# Patient Record
Sex: MALE | Race: WHITE | Employment: FULL TIME
[De-identification: names, ages, dates, MRNs, and addresses within clinical notes are randomized per-mention and may not be internally consistent; named-entity substitution may affect disease eponyms.]

---

## 2017-01-30 ENCOUNTER — OFFICE VISIT (OUTPATIENT)
Dept: FAMILY MEDICINE CLINIC | Facility: CLINIC | Age: 52
End: 2017-01-30

## 2017-01-30 VITALS
HEART RATE: 57 BPM | WEIGHT: 170 LBS | BODY MASS INDEX: 26.63 KG/M2 | SYSTOLIC BLOOD PRESSURE: 133 MMHG | DIASTOLIC BLOOD PRESSURE: 78 MMHG

## 2017-01-30 DIAGNOSIS — I10 BENIGN ESSENTIAL HTN: Primary | ICD-10-CM

## 2017-01-30 DIAGNOSIS — E78.00 HYPERCHOLESTEREMIA: ICD-10-CM

## 2017-01-30 PROCEDURE — 99213 OFFICE O/P EST LOW 20 MIN: CPT | Performed by: FAMILY MEDICINE

## 2017-01-30 ASSESSMENT — ENCOUNTER SYMPTOMS
WHEEZING: 0
DIARRHEA: 0
EYE PAIN: 0
SORE THROAT: 0
ABDOMINAL PAIN: 0
TROUBLE SWALLOWING: 0
SHORTNESS OF BREATH: 0
VOMITING: 0

## 2017-02-20 RX ORDER — CARVEDILOL 12.5 MG/1
TABLET ORAL
Qty: 60 TABLET | Refills: 5 | Status: SHIPPED | OUTPATIENT
Start: 2017-02-20 | End: 2017-06-02

## 2017-06-02 ENCOUNTER — OFFICE VISIT (OUTPATIENT)
Dept: FAMILY MEDICINE CLINIC | Age: 52
End: 2017-06-02
Payer: COMMERCIAL

## 2017-06-02 VITALS
BODY MASS INDEX: 27 KG/M2 | DIASTOLIC BLOOD PRESSURE: 80 MMHG | TEMPERATURE: 97.4 F | HEIGHT: 67 IN | OXYGEN SATURATION: 98 % | SYSTOLIC BLOOD PRESSURE: 125 MMHG | WEIGHT: 172 LBS | HEART RATE: 61 BPM

## 2017-06-02 DIAGNOSIS — N52.9 ERECTILE DYSFUNCTION, UNSPECIFIED ERECTILE DYSFUNCTION TYPE: ICD-10-CM

## 2017-06-02 DIAGNOSIS — I10 ESSENTIAL HYPERTENSION: Primary | ICD-10-CM

## 2017-06-02 DIAGNOSIS — Z12.5 PROSTATE CANCER SCREENING: ICD-10-CM

## 2017-06-02 DIAGNOSIS — Z12.11 COLON CANCER SCREENING: ICD-10-CM

## 2017-06-02 DIAGNOSIS — Z87.891 FORMER CIGARETTE SMOKER: ICD-10-CM

## 2017-06-02 DIAGNOSIS — E78.5 DYSLIPIDEMIA (HIGH LDL; LOW HDL): ICD-10-CM

## 2017-06-02 PROCEDURE — 99214 OFFICE O/P EST MOD 30 MIN: CPT | Performed by: INTERNAL MEDICINE

## 2017-06-02 RX ORDER — LISINOPRIL 20 MG/1
20 TABLET ORAL DAILY
Qty: 30 TABLET | Refills: 2 | Status: SHIPPED | OUTPATIENT
Start: 2017-06-02 | End: 2017-08-22 | Stop reason: SDUPTHER

## 2017-06-02 RX ORDER — SILDENAFIL 50 MG/1
50 TABLET, FILM COATED ORAL PRN
Qty: 6 TABLET | Refills: 3 | Status: SHIPPED | OUTPATIENT
Start: 2017-06-02 | End: 2018-06-21 | Stop reason: DRUGHIGH

## 2017-06-02 ASSESSMENT — PATIENT HEALTH QUESTIONNAIRE - PHQ9
2. FEELING DOWN, DEPRESSED OR HOPELESS: 0
1. LITTLE INTEREST OR PLEASURE IN DOING THINGS: 0
SUM OF ALL RESPONSES TO PHQ QUESTIONS 1-9: 0
SUM OF ALL RESPONSES TO PHQ9 QUESTIONS 1 & 2: 0

## 2017-06-17 LAB
ALBUMIN SERPL-MCNC: 4.7 G/DL (ref 3.2–5.3)
ALK PHOSPHATASE: 80 IU/L (ref 35–121)
ALT SERPL-CCNC: 24 IU/L (ref 5–59)
ANION GAP SERPL CALCULATED.3IONS-SCNC: 12 MMOL/L
AST SERPL-CCNC: 19 IU/L (ref 10–42)
BILIRUB SERPL-MCNC: 1.2 MG/DL (ref 0.2–1.3)
BUN BLDV-MCNC: 25 MG/DL (ref 10–20)
CALCIUM SERPL-MCNC: 9.8 MG/DL (ref 8.7–10.8)
CHLORIDE BLD-SCNC: 98 MMOL/L (ref 95–111)
CHOLESTEROL/HDL RATIO: 4.1
CHOLESTEROL: 183 MG/DL
CO2: 26 MMOL/L (ref 21–32)
CREAT SERPL-MCNC: 1.3 MG/DL (ref 0.5–1.3)
EGFR AFRICAN AMERICAN: 71
EGFR IF NONAFRICAN AMERICAN: 58
GLUCOSE: 89 MG/DL (ref 70–100)
HDLC SERPL-MCNC: 45 MG/DL (ref 40–60)
LDL CHOLESTEROL CALCULATED: 124 MG/DL
LDL/HDL RATIO: 2.8
POTASSIUM SERPL-SCNC: 4.4 MMOL/L (ref 3.5–5.4)
SODIUM BLD-SCNC: 132 MMOL/L (ref 134–147)
TOTAL PROTEIN: 7.3 G/DL (ref 5.8–8)
TRIGL SERPL-MCNC: 70 MG/DL
VLDLC SERPL CALC-MCNC: 14 MG/DL

## 2017-06-19 LAB — PSA, ULTRASENSITIVE: 0.49 NG/ML

## 2017-06-20 ENCOUNTER — TELEPHONE (OUTPATIENT)
Dept: FAMILY MEDICINE CLINIC | Age: 52
End: 2017-06-20

## 2017-06-20 ENCOUNTER — NURSE ONLY (OUTPATIENT)
Dept: FAMILY MEDICINE CLINIC | Age: 52
End: 2017-06-20

## 2017-06-20 VITALS — SYSTOLIC BLOOD PRESSURE: 114 MMHG | DIASTOLIC BLOOD PRESSURE: 66 MMHG | HEART RATE: 78 BPM

## 2017-06-20 DIAGNOSIS — I10 ESSENTIAL HYPERTENSION: Primary | ICD-10-CM

## 2017-06-20 DIAGNOSIS — I10 ESSENTIAL HYPERTENSION: ICD-10-CM

## 2017-06-20 DIAGNOSIS — R19.5 POSITIVE FIT (FECAL IMMUNOCHEMICAL TEST): Primary | ICD-10-CM

## 2017-06-20 DIAGNOSIS — Z12.5 PROSTATE CANCER SCREENING: ICD-10-CM

## 2017-06-20 DIAGNOSIS — Z12.11 COLON CANCER SCREENING: ICD-10-CM

## 2017-06-20 DIAGNOSIS — E78.5 DYSLIPIDEMIA (HIGH LDL; LOW HDL): ICD-10-CM

## 2017-06-20 LAB
CONTROL: PRESENT
HEMOCCULT STL QL: POSITIVE

## 2017-06-20 PROCEDURE — 82274 ASSAY TEST FOR BLOOD FECAL: CPT | Performed by: INTERNAL MEDICINE

## 2017-07-07 ENCOUNTER — OFFICE VISIT (OUTPATIENT)
Dept: GASTROENTEROLOGY | Age: 52
End: 2017-07-07
Payer: COMMERCIAL

## 2017-07-07 VITALS
DIASTOLIC BLOOD PRESSURE: 82 MMHG | WEIGHT: 168.4 LBS | SYSTOLIC BLOOD PRESSURE: 120 MMHG | BODY MASS INDEX: 26.43 KG/M2 | HEIGHT: 67 IN | OXYGEN SATURATION: 98 % | RESPIRATION RATE: 14 BRPM | TEMPERATURE: 98.1 F | HEART RATE: 65 BPM

## 2017-07-07 DIAGNOSIS — Z12.11 SCREENING FOR COLORECTAL CANCER: Primary | ICD-10-CM

## 2017-07-07 DIAGNOSIS — R19.5 POSITIVE FIT (FECAL IMMUNOCHEMICAL TEST): ICD-10-CM

## 2017-07-07 DIAGNOSIS — Z12.12 SCREENING FOR COLORECTAL CANCER: Primary | ICD-10-CM

## 2017-07-07 PROCEDURE — 99244 OFF/OP CNSLTJ NEW/EST MOD 40: CPT | Performed by: INTERNAL MEDICINE

## 2017-07-07 RX ORDER — SODIUM, POTASSIUM,MAG SULFATES 17.5-3.13G
SOLUTION, RECONSTITUTED, ORAL ORAL
Qty: 1 BOTTLE | Refills: 0 | Status: SHIPPED | OUTPATIENT
Start: 2017-07-07 | End: 2017-12-21 | Stop reason: ALTCHOICE

## 2017-07-07 ASSESSMENT — ENCOUNTER SYMPTOMS
DIARRHEA: 0
ABDOMINAL DISTENTION: 0
TROUBLE SWALLOWING: 0
CONSTIPATION: 0
VOMITING: 0
EYES NEGATIVE: 1
ABDOMINAL PAIN: 0
GASTROINTESTINAL NEGATIVE: 1
RESPIRATORY NEGATIVE: 1
BLOOD IN STOOL: 0
ANAL BLEEDING: 0
NAUSEA: 0
BACK PAIN: 1
ALLERGIC/IMMUNOLOGIC NEGATIVE: 1
RECTAL PAIN: 0

## 2017-08-22 DIAGNOSIS — I10 ESSENTIAL HYPERTENSION: ICD-10-CM

## 2017-08-22 RX ORDER — LISINOPRIL 20 MG/1
TABLET ORAL
Qty: 30 TABLET | Refills: 5 | Status: SHIPPED | OUTPATIENT
Start: 2017-08-22 | End: 2018-02-19 | Stop reason: SDUPTHER

## 2017-09-21 ENCOUNTER — OFFICE VISIT (OUTPATIENT)
Dept: FAMILY MEDICINE CLINIC | Age: 52
End: 2017-09-21
Payer: COMMERCIAL

## 2017-09-21 VITALS
WEIGHT: 169 LBS | HEART RATE: 93 BPM | DIASTOLIC BLOOD PRESSURE: 77 MMHG | OXYGEN SATURATION: 98 % | TEMPERATURE: 98.4 F | SYSTOLIC BLOOD PRESSURE: 116 MMHG | BODY MASS INDEX: 26.47 KG/M2

## 2017-09-21 DIAGNOSIS — E78.5 DYSLIPIDEMIA (HIGH LDL; LOW HDL): ICD-10-CM

## 2017-09-21 DIAGNOSIS — R19.5 POSITIVE FIT (FECAL IMMUNOCHEMICAL TEST): ICD-10-CM

## 2017-09-21 DIAGNOSIS — I10 ESSENTIAL HYPERTENSION: Primary | ICD-10-CM

## 2017-09-21 DIAGNOSIS — H00.011 HORDEOLUM EXTERNUM OF RIGHT UPPER EYELID: ICD-10-CM

## 2017-09-21 PROCEDURE — 99214 OFFICE O/P EST MOD 30 MIN: CPT | Performed by: INTERNAL MEDICINE

## 2017-09-25 ENCOUNTER — HOSPITAL ENCOUNTER (OUTPATIENT)
Age: 52
Setting detail: OUTPATIENT SURGERY
Discharge: HOME | End: 2017-09-25
Attending: INTERNAL MEDICINE | Admitting: INTERNAL MEDICINE
Payer: COMMERCIAL

## 2017-09-25 VITALS
SYSTOLIC BLOOD PRESSURE: 123 MMHG | TEMPERATURE: 97.2 F | WEIGHT: 165 LBS | RESPIRATION RATE: 14 BRPM | HEART RATE: 73 BPM | BODY MASS INDEX: 25.9 KG/M2 | OXYGEN SATURATION: 95 % | HEIGHT: 67 IN | DIASTOLIC BLOOD PRESSURE: 77 MMHG

## 2017-09-25 PROCEDURE — 2580000003 HC RX 258: Performed by: INTERNAL MEDICINE

## 2017-09-25 PROCEDURE — 6360000002 HC RX W HCPCS: Performed by: INTERNAL MEDICINE

## 2017-09-25 PROCEDURE — 99153 MOD SED SAME PHYS/QHP EA: CPT | Performed by: INTERNAL MEDICINE

## 2017-09-25 PROCEDURE — 7100000011 HC PHASE II RECOVERY - ADDTL 15 MIN: Performed by: INTERNAL MEDICINE

## 2017-09-25 PROCEDURE — 88305 TISSUE EXAM BY PATHOLOGIST: CPT

## 2017-09-25 PROCEDURE — 99152 MOD SED SAME PHYS/QHP 5/>YRS: CPT | Performed by: INTERNAL MEDICINE

## 2017-09-25 PROCEDURE — 3609010300 HC COLONOSCOPY W/BIOPSY SINGLE/MULTIPLE: Performed by: INTERNAL MEDICINE

## 2017-09-25 PROCEDURE — 7100000010 HC PHASE II RECOVERY - FIRST 15 MIN: Performed by: INTERNAL MEDICINE

## 2017-09-25 RX ORDER — MEPERIDINE HYDROCHLORIDE 50 MG/ML
INJECTION INTRAMUSCULAR; INTRAVENOUS; SUBCUTANEOUS PRN
Status: DISCONTINUED | OUTPATIENT
Start: 2017-09-25 | End: 2017-09-25 | Stop reason: HOSPADM

## 2017-09-25 RX ORDER — SODIUM CHLORIDE, SODIUM LACTATE, POTASSIUM CHLORIDE, CALCIUM CHLORIDE 600; 310; 30; 20 MG/100ML; MG/100ML; MG/100ML; MG/100ML
INJECTION, SOLUTION INTRAVENOUS CONTINUOUS
Status: DISCONTINUED | OUTPATIENT
Start: 2017-09-25 | End: 2017-09-25 | Stop reason: HOSPADM

## 2017-09-25 RX ORDER — MIDAZOLAM HYDROCHLORIDE 1 MG/ML
INJECTION INTRAMUSCULAR; INTRAVENOUS PRN
Status: DISCONTINUED | OUTPATIENT
Start: 2017-09-25 | End: 2017-09-25 | Stop reason: HOSPADM

## 2017-09-25 RX ADMIN — SODIUM CHLORIDE, POTASSIUM CHLORIDE, SODIUM LACTATE AND CALCIUM CHLORIDE: 600; 310; 30; 20 INJECTION, SOLUTION INTRAVENOUS at 08:10

## 2017-09-25 ASSESSMENT — PAIN SCALES - GENERAL
PAINLEVEL_OUTOF10: 0

## 2017-09-25 ASSESSMENT — PAIN - FUNCTIONAL ASSESSMENT: PAIN_FUNCTIONAL_ASSESSMENT: 0-10

## 2017-09-26 LAB — SURGICAL PATHOLOGY REPORT: NORMAL

## 2017-10-09 ENCOUNTER — OFFICE VISIT (OUTPATIENT)
Dept: GASTROENTEROLOGY | Age: 52
End: 2017-10-09
Payer: COMMERCIAL

## 2017-10-09 VITALS
WEIGHT: 169.1 LBS | TEMPERATURE: 98.1 F | SYSTOLIC BLOOD PRESSURE: 134 MMHG | RESPIRATION RATE: 14 BRPM | BODY MASS INDEX: 26.54 KG/M2 | HEART RATE: 78 BPM | DIASTOLIC BLOOD PRESSURE: 86 MMHG | HEIGHT: 67 IN

## 2017-10-09 DIAGNOSIS — T39.395D: ICD-10-CM

## 2017-10-09 DIAGNOSIS — K52.9 ILEITIS: Primary | ICD-10-CM

## 2017-10-09 DIAGNOSIS — K63.89: ICD-10-CM

## 2017-10-09 PROCEDURE — 99214 OFFICE O/P EST MOD 30 MIN: CPT | Performed by: INTERNAL MEDICINE

## 2017-10-09 ASSESSMENT — ENCOUNTER SYMPTOMS
BLOOD IN STOOL: 0
RESPIRATORY NEGATIVE: 1
ABDOMINAL DISTENTION: 0
VOMITING: 0
EYES NEGATIVE: 1
ALLERGIC/IMMUNOLOGIC NEGATIVE: 1
TROUBLE SWALLOWING: 0
NAUSEA: 0
DIARRHEA: 0
BACK PAIN: 1
ABDOMINAL PAIN: 0
RECTAL PAIN: 0
ANAL BLEEDING: 0
GASTROINTESTINAL NEGATIVE: 1
CONSTIPATION: 0

## 2017-10-09 NOTE — PROGRESS NOTES
Constitutional: He is oriented to person, place, and time. He appears well-developed and well-nourished. HENT:   Head: Normocephalic and atraumatic. Eyes: Conjunctivae and EOM are normal. Pupils are equal, round, and reactive to light. Neck: Normal range of motion. Neck supple. Cardiovascular: Normal rate and regular rhythm. Pulmonary/Chest: Effort normal and breath sounds normal.   Abdominal: Soft. Bowel sounds are normal.   NON TENDER, NON DISTENTED  LIVER SPLEEN AND HERNIAS ARE NOT  PALPABLE  BOWEL SOUNDS ARE POSITIVE      Genitourinary: Rectum normal.   Musculoskeletal: Normal range of motion. Neurological: He is alert and oriented to person, place, and time. He has normal reflexes. Skin: Skin is warm. Assessment:      As above      Plan:      Avoid NSAIDs    Pt was advised in detail about some life style and dietary modifications. He was advised about avoidance of caffeine, nicotine and chocolate. Pt was also told to stay away from any kind of fast foods, soda pops. He was also advised to avoid lots of spices, grease and fried food etc.     Instructions were also given about trying to arrange the timing, quality and quantity of food. Instructions were given about using ample amount of fiber including dietary and supplemental fiber either metamucil, bennafiber or citrucell etc.  Pt was advised about drinking ample amount of water without any colors or chemicals. Stress was given about regular exercise. Pt has verbalized understanding and agreement to these modifications.       The patient was instructed to start taking some OTC Probiotics products   These are available over the counter at the Pharmacy stores and Grocery stores  He was explained about the beneficial effects they have in the GI track  They will help to establish the good bacterial kory and will help with the digestion and bowel movements  The patient has verbalized understanding and agreement to this plan    Repeat Colon in 5 years if stable

## 2017-11-20 NOTE — TELEPHONE ENCOUNTER
Last filled 11/21/16 #180 with 2 RF  Last seen 9/21/17    Next Visit Date:  Future Appointments  Date Time Provider Guzman Love   12/21/2017 1:00 PM Funmilayo Jackson  [address] [facility]   Topic Date Due    Flu vaccine (1) 09/01/2017    DTaP/Tdap/Td vaccine (2 - Td) 02/14/2022    Lipid screen  06/17/2022    Colon cancer screen colonoscopy  09/25/2022    HIV screen  Addressed       Hemoglobin A1C (%)   Date Value   09/27/2014 5.8             ( goal A1C is < 7)   No results found for: LABMICR  LDL Calculated (mg/dL)   Date Value   06/17/2017 124       (goal LDL is <100)   AST (IU/L)   Date Value   06/17/2017 19     ALT (IU/L)   Date Value   06/17/2017 24     BUN (mg/dl)   Date Value   06/17/2017 25 (H)     BP Readings from Last 3 Encounters:   10/09/17 134/86   09/25/17 123/77   09/21/17 116/77          (goal 120/80)    All Future Testing planned in [facility]  Lab Frequency Next Occurrence               Patient Active Problem List:     Dyslipidemia (high LDL; low HDL)     Former cigarette smoker     Essential hypertension     Positive FIT (fecal immunochemical test)     Edema of colon

## 2017-11-21 RX ORDER — NAPROXEN 500 MG/1
TABLET ORAL
Qty: 180 TABLET | Refills: 1 | Status: SHIPPED | OUTPATIENT
Start: 2017-11-21 | End: 2018-07-16 | Stop reason: SDUPTHER

## 2017-12-21 ENCOUNTER — OFFICE VISIT (OUTPATIENT)
Dept: FAMILY MEDICINE CLINIC | Age: 52
End: 2017-12-21
Payer: COMMERCIAL

## 2017-12-21 VITALS
BODY MASS INDEX: 26.28 KG/M2 | HEART RATE: 77 BPM | DIASTOLIC BLOOD PRESSURE: 80 MMHG | SYSTOLIC BLOOD PRESSURE: 134 MMHG | WEIGHT: 167.8 LBS | OXYGEN SATURATION: 97 % | TEMPERATURE: 97.5 F

## 2017-12-21 DIAGNOSIS — K50.00 TERMINAL ILEITIS WITHOUT COMPLICATION (HCC): ICD-10-CM

## 2017-12-21 DIAGNOSIS — E78.5 DYSLIPIDEMIA (HIGH LDL; LOW HDL): ICD-10-CM

## 2017-12-21 DIAGNOSIS — I10 ESSENTIAL HYPERTENSION: Primary | ICD-10-CM

## 2017-12-21 PROCEDURE — 99214 OFFICE O/P EST MOD 30 MIN: CPT | Performed by: INTERNAL MEDICINE

## 2017-12-21 NOTE — PROGRESS NOTES
Component Value Date    GLUCOSE 89 06/17/2017    CO2 26 06/17/2017    BUN 25 06/17/2017    CREATININE 1.3 06/17/2017    CALCIUM 9.8 06/17/2017       Colonoscopy reviewed     Assessment/Plan:           1. Essential hypertension  - continue lisinopril 20mg pO QD     2. Terminal ileitis without complication ([facility] [address]  - asymptomatic   - will f/u GI  - try to cut back on NSAIDs    3.  Dyslipidemia (high LDL; low HDL)  - lifestyle management, labs due 6/2017      Electronically signed by Macrina Carrera MD on 12/21/2017 at 1:27 PM

## 2018-02-19 DIAGNOSIS — I10 ESSENTIAL HYPERTENSION: ICD-10-CM

## 2018-02-19 RX ORDER — LISINOPRIL 20 MG/1
TABLET ORAL
Qty: 30 TABLET | Refills: 5 | Status: SHIPPED | OUTPATIENT
Start: 2018-02-19 | End: 2018-08-20 | Stop reason: SDUPTHER

## 2018-02-19 NOTE — TELEPHONE ENCOUNTER
Last refill 01/18/2018  Last visit 12/21/2017    Next Visit Date:  Future Appointments  Date Time Provider Guzman De Oliveirai   6/21/2018 1:00 PM Funmilayo Brock  [address] [facility]   Topic Date Due    A1C test (Diabetic or Prediabetic)  09/27/2015    Flu vaccine (1) 12/21/2019 (Originally 9/1/2017)    Potassium monitoring  06/17/2018    Creatinine monitoring  06/17/2018    DTaP/Tdap/Td vaccine (2 - Td) 02/14/2022    Lipid screen  06/17/2022    Colon cancer screen colonoscopy  09/25/2022    HIV screen  Addressed       Hemoglobin A1C (%)   Date Value   09/27/2014 5.8             ( goal A1C is < 7)   No results found for: LABMICR  LDL Calculated (mg/dL)   Date Value   06/17/2017 124       (goal LDL is <100)   AST (IU/L)   Date Value   06/17/2017 19     ALT (IU/L)   Date Value   06/17/2017 24     BUN (mg/dl)   Date Value   06/17/2017 25 (H)     BP Readings from Last 3 Encounters:   12/21/17 134/80   10/09/17 134/86   09/25/17 123/77          (goal 120/80)    All Future Testing planned in [facility]  Lab Frequency Next Occurrence               Patient Active Problem List:     Dyslipidemia (high LDL; low HDL)     Former cigarette smoker     Essential hypertension     Positive FIT (fecal immunochemical test)     Edema of colon     Terminal ileitis without complication ([facility] [address]

## 2018-06-21 ENCOUNTER — OFFICE VISIT (OUTPATIENT)
Dept: FAMILY MEDICINE CLINIC | Age: 53
End: 2018-06-21
Payer: COMMERCIAL

## 2018-06-21 VITALS
HEART RATE: 80 BPM | OXYGEN SATURATION: 95 % | WEIGHT: 158.2 LBS | BODY MASS INDEX: 24.78 KG/M2 | RESPIRATION RATE: 16 BRPM | SYSTOLIC BLOOD PRESSURE: 104 MMHG | TEMPERATURE: 98.1 F | DIASTOLIC BLOOD PRESSURE: 70 MMHG

## 2018-06-21 DIAGNOSIS — E78.5 DYSLIPIDEMIA (HIGH LDL; LOW HDL): ICD-10-CM

## 2018-06-21 DIAGNOSIS — Z13.1 SCREENING FOR DIABETES MELLITUS (DM): ICD-10-CM

## 2018-06-21 DIAGNOSIS — K50.00 TERMINAL ILEITIS WITHOUT COMPLICATION (HCC): ICD-10-CM

## 2018-06-21 DIAGNOSIS — N52.9 ERECTILE DYSFUNCTION, UNSPECIFIED ERECTILE DYSFUNCTION TYPE: ICD-10-CM

## 2018-06-21 DIAGNOSIS — I10 ESSENTIAL HYPERTENSION: Primary | ICD-10-CM

## 2018-06-21 DIAGNOSIS — Z13.29 SCREENING FOR THYROID DISORDER: ICD-10-CM

## 2018-06-21 DIAGNOSIS — M62.838 MUSCLE SPASM: ICD-10-CM

## 2018-06-21 PROCEDURE — 99214 OFFICE O/P EST MOD 30 MIN: CPT | Performed by: INTERNAL MEDICINE

## 2018-06-21 RX ORDER — SILDENAFIL 100 MG/1
100 TABLET, FILM COATED ORAL PRN
Qty: 6 TABLET | Refills: 0 | Status: SHIPPED | OUTPATIENT
Start: 2018-06-21 | End: 2018-07-16 | Stop reason: SDUPTHER

## 2018-06-21 RX ORDER — CYCLOBENZAPRINE HCL 10 MG
10 TABLET ORAL NIGHTLY PRN
Qty: 30 TABLET | Refills: 0 | Status: SHIPPED | OUTPATIENT
Start: 2018-06-21 | End: 2019-06-14 | Stop reason: SDUPTHER

## 2018-06-21 ASSESSMENT — PATIENT HEALTH QUESTIONNAIRE - PHQ9
2. FEELING DOWN, DEPRESSED OR HOPELESS: 0
SUM OF ALL RESPONSES TO PHQ9 QUESTIONS 1 & 2: 0
SUM OF ALL RESPONSES TO PHQ QUESTIONS 1-9: 0
1. LITTLE INTEREST OR PLEASURE IN DOING THINGS: 0

## 2018-07-16 DIAGNOSIS — N52.9 ERECTILE DYSFUNCTION, UNSPECIFIED ERECTILE DYSFUNCTION TYPE: ICD-10-CM

## 2018-07-16 RX ORDER — NAPROXEN 500 MG/1
TABLET ORAL
Qty: 180 TABLET | Refills: 1 | Status: SHIPPED | OUTPATIENT
Start: 2018-07-16 | End: 2019-04-17 | Stop reason: SDUPTHER

## 2018-07-16 RX ORDER — SILDENAFIL 100 MG/1
TABLET, FILM COATED ORAL
Qty: 6 TABLET | Refills: 0 | Status: SHIPPED | OUTPATIENT
Start: 2018-07-16 | End: 2018-08-19 | Stop reason: SDUPTHER

## 2018-07-16 NOTE — TELEPHONE ENCOUNTER
Last seen 6/21/18  Next appt 12/21/18    Next Visit Date:  Future Appointments  Date Time Provider Guzman Love   12/21/2018 1:00 PM Funmilayo Lucero  [address] [facility]   Topic Date Due    A1C test (Diabetic or Prediabetic)  09/27/2015    Potassium monitoring  06/17/2018    Creatinine monitoring  06/17/2018    Shingles Vaccine (1 of 2 - 2 Dose Series) 06/21/2019 (Originally 5/29/2017)    Flu vaccine (1) 12/21/2019 (Originally 9/1/2018)    DTaP/Tdap/Td vaccine (2 - Td) 02/14/2022    Lipid screen  06/17/2022    Colon cancer screen colonoscopy  09/25/2022    HIV screen  Addressed       Hemoglobin A1C (%)   Date Value   09/27/2014 5.8             ( goal A1C is < 7)   No results found for: LABMICR  LDL Calculated (mg/dL)   Date Value   06/17/2017 124   11/12/2016 131 (H)       (goal LDL is <100)   AST (IU/L)   Date Value   06/17/2017 19     ALT (IU/L)   Date Value   06/17/2017 24     BUN (mg/dl)   Date Value   06/17/2017 25 (H)     BP Readings from Last 3 Encounters:   06/21/18 104/70   12/21/17 134/80   10/09/17 134/86          (goal 120/80)    All Future Testing planned in [facility]  Lab Frequency Next Occurrence   Comprehensive Metabolic Panel Once 86/57/6560   Lipid, Fasting Once 08/06/2018   TSH With Reflex Ft4 Once 08/06/2018   Hemoglobin A1C Once 08/06/2018               Patient Active Problem List:     Dyslipidemia (high LDL; low HDL)     Former cigarette smoker     Essential hypertension     Positive FIT (fecal immunochemical test)     Edema of colon     Terminal ileitis without complication ([address]

## 2018-07-17 LAB
ALBUMIN SERPL-MCNC: 4.8 G/DL
ALP BLD-CCNC: 86 U/L
ALT SERPL-CCNC: 22 U/L
ANION GAP SERPL CALCULATED.3IONS-SCNC: 14 MMOL/L
AST SERPL-CCNC: 20 U/L
AVERAGE GLUCOSE: 108
BILIRUB SERPL-MCNC: 1 MG/DL (ref 0.1–1.4)
BUN BLDV-MCNC: 16 MG/DL
CALCIUM SERPL-MCNC: 9.3 MG/DL
CHLORIDE BLD-SCNC: 98 MMOL/L
CHOLESTEROL, FASTING: 160
CO2: 24 MMOL/L
CREAT SERPL-MCNC: 1 MG/DL
GFR CALCULATED: >59
GLUCOSE BLD-MCNC: 89 MG/DL
HBA1C MFR BLD: 5.4 %
HDLC SERPL-MCNC: 41 MG/DL (ref 35–70)
LDL CHOLESTEROL CALCULATED: 105 MG/DL (ref 0–160)
POTASSIUM SERPL-SCNC: 4.5 MMOL/L
SODIUM BLD-SCNC: 136 MMOL/L
TOTAL PROTEIN: 7.1
TRIGLYCERIDE, FASTING: 68
TSH SERPL DL<=0.05 MIU/L-ACNC: 1.37 UIU/ML

## 2018-07-24 DIAGNOSIS — I10 ESSENTIAL HYPERTENSION: ICD-10-CM

## 2018-07-24 DIAGNOSIS — Z13.29 SCREENING FOR THYROID DISORDER: ICD-10-CM

## 2018-07-24 DIAGNOSIS — Z13.1 SCREENING FOR DIABETES MELLITUS (DM): ICD-10-CM

## 2018-07-24 DIAGNOSIS — E78.5 DYSLIPIDEMIA (HIGH LDL; LOW HDL): ICD-10-CM

## 2018-08-19 DIAGNOSIS — N52.9 ERECTILE DYSFUNCTION, UNSPECIFIED ERECTILE DYSFUNCTION TYPE: ICD-10-CM

## 2018-08-19 DIAGNOSIS — I10 ESSENTIAL HYPERTENSION: ICD-10-CM

## 2018-08-20 RX ORDER — SILDENAFIL 100 MG/1
TABLET, FILM COATED ORAL
Qty: 6 TABLET | Refills: 5 | Status: SHIPPED | OUTPATIENT
Start: 2018-08-20 | End: 2019-09-11 | Stop reason: SDUPTHER

## 2018-08-20 RX ORDER — LISINOPRIL 20 MG/1
TABLET ORAL
Qty: 30 TABLET | Refills: 5 | Status: SHIPPED | OUTPATIENT
Start: 2018-08-20 | End: 2018-12-14 | Stop reason: SDUPTHER

## 2018-08-20 NOTE — TELEPHONE ENCOUNTER
Lisinopril filled 2/2018 #30 with 5 RF  viagra filled 7/16/18 #6 with 0 RF    Next Visit Date:  Future Appointments  Date Time Provider Guzman Love   12/21/2018 1:00 PM Funmilayo Huynh  [address] [facility]   Topic Date Due    Shingles Vaccine (1 of 2 - 2 Dose Series) 06/21/2019 (Originally 5/29/2017)    Flu vaccine (1) 12/21/2019 (Originally 9/1/2018)    Potassium monitoring  07/17/2019    Creatinine monitoring  07/17/2019    DTaP/Tdap/Td vaccine (2 - Td) 02/14/2022    Colon cancer screen colonoscopy  09/25/2022    Lipid screen  07/17/2023    HIV screen  Addressed       Hemoglobin A1C (%)   Date Value   07/17/2018 5.4   09/27/2014 5.8             ( goal A1C is < 7)   No results found for: LABMICR  LDL Calculated (mg/dL)   Date Value   07/17/2018 105   06/17/2017 124       (goal LDL is <100)   AST (U/L)   Date Value   07/17/2018 20     ALT (U/L)   Date Value   07/17/2018 22     BUN (mg/dL)   Date Value   07/17/2018 16     BP Readings from Last 3 Encounters:   06/21/18 104/70   12/21/17 134/80   10/09/17 134/86          (goal 120/80)    All Future Testing planned in [facility]  Lab Frequency Next Occurrence               Patient Active Problem List:     Dyslipidemia (high LDL; low HDL)     Former cigarette smoker     Essential hypertension     Positive FIT (fecal immunochemical test)     Edema of colon     Terminal ileitis without complication ([facility] [address]

## 2018-12-14 ENCOUNTER — OFFICE VISIT (OUTPATIENT)
Dept: FAMILY MEDICINE CLINIC | Age: 53
End: 2018-12-14
Payer: COMMERCIAL

## 2018-12-14 VITALS
HEART RATE: 85 BPM | BODY MASS INDEX: 25.02 KG/M2 | RESPIRATION RATE: 16 BRPM | SYSTOLIC BLOOD PRESSURE: 118 MMHG | TEMPERATURE: 98 F | WEIGHT: 159.4 LBS | DIASTOLIC BLOOD PRESSURE: 68 MMHG | OXYGEN SATURATION: 97 % | HEIGHT: 67 IN

## 2018-12-14 DIAGNOSIS — I10 ESSENTIAL HYPERTENSION: ICD-10-CM

## 2018-12-14 DIAGNOSIS — F17.200 CURRENT SMOKER: Primary | ICD-10-CM

## 2018-12-14 DIAGNOSIS — N52.9 ERECTILE DYSFUNCTION, UNSPECIFIED ERECTILE DYSFUNCTION TYPE: ICD-10-CM

## 2018-12-14 DIAGNOSIS — J20.9 ACUTE BRONCHITIS, UNSPECIFIED ORGANISM: ICD-10-CM

## 2018-12-14 DIAGNOSIS — E78.5 DYSLIPIDEMIA (HIGH LDL; LOW HDL): ICD-10-CM

## 2018-12-14 PROCEDURE — 99214 OFFICE O/P EST MOD 30 MIN: CPT | Performed by: INTERNAL MEDICINE

## 2018-12-14 RX ORDER — PREDNISONE 20 MG/1
20 TABLET ORAL DAILY
Qty: 5 TABLET | Refills: 0 | Status: SHIPPED | OUTPATIENT
Start: 2018-12-14 | End: 2018-12-19

## 2018-12-14 RX ORDER — TADALAFIL 5 MG/1
5 TABLET ORAL DAILY
Qty: 30 TABLET | Refills: 0 | Status: SHIPPED | OUTPATIENT
Start: 2018-12-14 | End: 2019-09-11 | Stop reason: SDUPTHER

## 2018-12-14 RX ORDER — AMOXICILLIN AND CLAVULANATE POTASSIUM 875; 125 MG/1; MG/1
1 TABLET, FILM COATED ORAL 2 TIMES DAILY
Qty: 14 TABLET | Refills: 0 | Status: SHIPPED | OUTPATIENT
Start: 2018-12-14 | End: 2018-12-21

## 2018-12-14 RX ORDER — LISINOPRIL 20 MG/1
TABLET ORAL
Qty: 30 TABLET | Refills: 5 | Status: SHIPPED | OUTPATIENT
Start: 2018-12-14 | End: 2019-06-14 | Stop reason: SDUPTHER

## 2018-12-14 RX ORDER — TADALAFIL 5 MG/1
5 TABLET ORAL PRN
Qty: 10 TABLET | Refills: 1 | Status: SHIPPED | OUTPATIENT
Start: 2018-12-14 | End: 2019-06-14 | Stop reason: SDUPTHER

## 2018-12-14 NOTE — PROGRESS NOTES
Years: 28.00     Types: Cigarettes     Last attempt to quit: 11/1/2016    Smokeless tobacco: Never Used    Alcohol use 6.0 oz/week     12 Standard drinks or equivalent per week      Comment: rare        Review of Systems  Energy level good overall, and weight is stable. No chest pain or shortness of breath. Bowels have been normal without constipation or diarrhea       Objective:          /68 (Site: Left Upper Arm, Position: Sitting, Cuff Size: Medium Adult)   Pulse 85   Temp 98 °F (36.7 °C) (Oral)   Resp 16   Ht 5' 7.01\" (1.702 m)   Wt 159 lb 6.4 oz (72.3 kg)   SpO2 97%   BMI 24.96 kg/m²     General: Alert and oriented, in no distress. Patient ambulating with normal gait. Normal body habitus. ENT: clear rhinorrhea, clear post-nasal drip, no oropharyngeal erythema, subcentimeter anterior chain cervical lymphadenopathy noted  Chest: scattered wheezes with globally decreased air entry noted. No retractions, or use of accessory muscles noted. Cardiovascular: PMI is not displaced, and no thrill noted. Regular rate and rhythm with no rub, murmur or gallop. There is no peripheral edema. Pedal pulses are normal.   Abdomen: Abdomen is soft and nontender. The bowel sounds are normal.   Musculoskeletal: There are no deformities of the the extremities. Patient has all ten fingers intact. The patient has full range of motion on all 4 extremities without pain. Skin: The skin is warm and dry. There are no rashes noted. Prior to Visit Medications    Medication Sig Taking?  Authorizing Provider   sildenafil (VIAGRA) 100 MG tablet take 1 tablet by mouth if needed for ERECTILE DYSFUNCTION Yes LUIS E Glover CNP   lisinopril (PRINIVIL;ZESTRIL) 20 MG tablet take 1 tablet by mouth daily Yes LUIS E Glover CNP   naproxen (NAPROSYN) 500 MG tablet take 1 tablet by mouth twice a day with food Yes LUIS E Glover CNP   cyclobenzaprine (FLEXERIL) 10 MG tablet Take 1 tablet by mouth

## 2019-01-22 RX ORDER — TADALAFIL 5 MG/1
TABLET ORAL
Qty: 30 TABLET | Refills: 0 | Status: SHIPPED | OUTPATIENT
Start: 2019-01-22 | End: 2019-06-14 | Stop reason: SDUPTHER

## 2019-04-17 NOTE — TELEPHONE ENCOUNTER
Last visit  12/14/18  Next Visit Date:  Future Appointments   Date Time Provider Guzman De Oliveirai   6/14/2019  9:45 AM Funmilayo Sanders  [address] [facility]   Topic Date Due    Pneumococcal 0-64 years Vaccine (1 of 1 - PPSV23) 05/29/1973    Shingles Vaccine (1 of 2) 06/21/2019 (Originally 5/29/2017)    Flu vaccine (Season Ended) 12/21/2019 (Originally 9/1/2019)    Potassium monitoring  07/17/2019    Creatinine monitoring  07/17/2019    DTaP/Tdap/Td vaccine (2 - Td) 02/14/2022    Colon cancer screen colonoscopy  09/25/2022    Lipid screen  07/17/2023    HIV screen  Addressed       Hemoglobin A1C (%)   Date Value   07/17/2018 5.4   09/27/2014 5.8             ( goal A1C is < 7)   No results found for: LABMICR  LDL Calculated (mg/dL)   Date Value   07/17/2018 105   06/17/2017 124       (goal LDL is <100)   AST (U/L)   Date Value   07/17/2018 20     ALT (U/L)   Date Value   07/17/2018 22     BUN (mg/dL)   Date Value   07/17/2018 16     BP Readings from Last 3 Encounters:   12/14/18 118/68   06/21/18 104/70   12/21/17 134/80          (goal 120/80)    All Future Testing planned in [facility]  Lab Frequency Next Occurrence               Patient Active Problem List:     Dyslipidemia (high LDL; low HDL)     Essential hypertension     Positive FIT (fecal immunochemical test)     Edema of colon     Terminal ileitis without complication (HCC)     Erectile dysfunction

## 2019-04-18 RX ORDER — NAPROXEN 500 MG/1
TABLET ORAL
Qty: 180 TABLET | Refills: 1 | Status: SHIPPED | OUTPATIENT
Start: 2019-04-18 | End: 2019-06-14

## 2019-06-14 ENCOUNTER — OFFICE VISIT (OUTPATIENT)
Dept: FAMILY MEDICINE CLINIC | Age: 54
End: 2019-06-14
Payer: COMMERCIAL

## 2019-06-14 VITALS
WEIGHT: 151.8 LBS | BODY MASS INDEX: 23.77 KG/M2 | RESPIRATION RATE: 16 BRPM | TEMPERATURE: 97.7 F | DIASTOLIC BLOOD PRESSURE: 76 MMHG | OXYGEN SATURATION: 99 % | HEART RATE: 65 BPM | SYSTOLIC BLOOD PRESSURE: 108 MMHG

## 2019-06-14 DIAGNOSIS — Z12.5 PROSTATE CANCER SCREENING: ICD-10-CM

## 2019-06-14 DIAGNOSIS — M62.838 MUSCLE SPASM: ICD-10-CM

## 2019-06-14 DIAGNOSIS — M15.9 PRIMARY OSTEOARTHRITIS INVOLVING MULTIPLE JOINTS: ICD-10-CM

## 2019-06-14 DIAGNOSIS — I10 ESSENTIAL HYPERTENSION: ICD-10-CM

## 2019-06-14 DIAGNOSIS — G47.9 SLEEPING DIFFICULTY: ICD-10-CM

## 2019-06-14 DIAGNOSIS — E78.5 DYSLIPIDEMIA (HIGH LDL; LOW HDL): Primary | ICD-10-CM

## 2019-06-14 DIAGNOSIS — Z72.0 TOBACCO ABUSE: ICD-10-CM

## 2019-06-14 DIAGNOSIS — Z13.0 SCREENING FOR DEFICIENCY ANEMIA: ICD-10-CM

## 2019-06-14 PROCEDURE — 99214 OFFICE O/P EST MOD 30 MIN: CPT | Performed by: INTERNAL MEDICINE

## 2019-06-14 RX ORDER — CYCLOBENZAPRINE HCL 10 MG
10 TABLET ORAL NIGHTLY PRN
Qty: 30 TABLET | Refills: 2 | Status: SHIPPED | OUTPATIENT
Start: 2019-06-14 | End: 2020-09-02 | Stop reason: SDUPTHER

## 2019-06-14 RX ORDER — LISINOPRIL 20 MG/1
TABLET ORAL
Qty: 90 TABLET | Refills: 1 | Status: SHIPPED | OUTPATIENT
Start: 2019-06-14 | End: 2019-12-10 | Stop reason: SDUPTHER

## 2019-06-14 RX ORDER — DICLOFENAC SODIUM 75 MG/1
75 TABLET, DELAYED RELEASE ORAL 2 TIMES DAILY
Qty: 60 TABLET | Refills: 3 | Status: SHIPPED | OUTPATIENT
Start: 2019-06-14 | End: 2019-12-18 | Stop reason: CLARIF

## 2019-06-14 RX ORDER — SAW/PYGEUM/BETA/HERB/D3/B6/ZN 30 MG-25MG
10 CAPSULE ORAL EVERY EVENING
Qty: 30 TABLET | Refills: 3 | Status: SHIPPED | OUTPATIENT
Start: 2019-06-14

## 2019-06-14 ASSESSMENT — PATIENT HEALTH QUESTIONNAIRE - PHQ9
SUM OF ALL RESPONSES TO PHQ QUESTIONS 1-9: 1
SUM OF ALL RESPONSES TO PHQ9 QUESTIONS 1 & 2: 1
1. LITTLE INTEREST OR PLEASURE IN DOING THINGS: 0
2. FEELING DOWN, DEPRESSED OR HOPELESS: 1
SUM OF ALL RESPONSES TO PHQ QUESTIONS 1-9: 1

## 2019-06-14 NOTE — PROGRESS NOTES
Subjective:       Patient ID: Babita Guan is a 48 y.o. male who presents for   Chief Complaint   Patient presents with    Hypertension    Medication Check     Naproxen     Insomnia   , and follow up of chronic medical problems. HPI:  Nursing note reviewed and discussed with patient. Dyslipidemia  Patient presents for evaluation of lipids. Compliance with treatment thus far has been good. A repeat fasting lipid profile was done. The patient does use medications that may worsen dyslipidemias (corticosteroids, progestins, anabolic steroids, diuretics, beta-blockers, amiodarone, cyclosporine, olanzapine). The patient exercises frequently. The patient is not known to have coexisting coronary artery disease. Hypertension  Patient is here for follow-up of elevated blood pressure. He is exercising and is adherent to a low-salt diet. Blood pressure is well controlled at home. Cardiac symptoms: none. Patient denies chest pain, chest pressure/discomfort, claudication, dyspnea, exertional chest pressure/discomfort, fatigue, irregular heart beat, lower extremity edema, near-syncope, orthopnea and palpitations. Cardiovascular risk factors: dyslipidemia, family history of premature cardiovascular disease, hypertension, male gender and sedentary lifestyle. Use of agents associated with hypertension: none. History of target organ damage: none. Taking naproxen BID for generalzied aches and pains not relieved by tylenol. --> pain is much worse for the last 2-3 months. Difficulty staying asleep, no difficulty falling asleep. Bedtime 12:30-1AM, not sure what wakes him up but wakes up in 3-4 hours, typically not able to fall back asleep. He is not exercising before bed, eats 2 hours before going to bed. No heartburn, no urination. Timed release melatonin works half the time. Started smoking in summer 2018, he is mostly vaping.        Patient's medications, allergies, past medical, surgical, social and family histories were reviewed and updated as appropriate. Social History     Tobacco Use    Smoking status: Current Every Day Smoker     Packs/day: 1.00     Years: 28.00     Pack years: 28.00     Types: Cigarettes     Last attempt to quit: 2016     Years since quittin.6    Smokeless tobacco: Never Used   Substance Use Topics    Alcohol use: Yes     Alcohol/week: 6.0 oz     Types: 12 Standard drinks or equivalent per week     Comment: rare        Review of Systems  Energy level good overall, and weight is stable. No chest pain or shortness of breath. Bowels have been normal without constipation or diarrhea       Objective:          /76 (Site: Left Upper Arm, Position: Sitting, Cuff Size: Medium Adult)   Pulse 65   Temp 97.7 °F (36.5 °C) (Oral)   Resp 16   Wt 151 lb 12.8 oz (68.9 kg)   SpO2 99%   BMI 23.77 kg/m²     General: Alert and oriented, in no distress. Patient ambulating with normal gait. Normal body habitus. ENT: clear rhinorrhea, clear post-nasal drip, no oropharyngeal erythema, subcentimeter anterior chain cervical lymphadenopathy noted  Chest: scattered wheezes with globally decreased air entry noted. No retractions, or use of accessory muscles noted. Cardiovascular: PMI is not displaced, and no thrill noted. Regular rate and rhythm with no rub, murmur or gallop. There is no peripheral edema. Pedal pulses are normal.   Abdomen: Abdomen is soft and nontender. The bowel sounds are normal.   Musculoskeletal: There are no deformities of the the extremities. Patient has all ten fingers intact. The patient has full range of motion on all 4 extremities without pain. Skin: The skin is warm and dry. There are no rashes noted. Prior to Visit Medications    Medication Sig Taking?  Authorizing Provider   naproxen (NAPROSYN) 500 MG tablet take 1 tablet by mouth twice a day with food Yes LUIS E Glover - CNP   lisinopril (PRINIVIL;ZESTRIL) 20 MG tablet take 1 tablet by mouth daily Yes Funmilayo Ontiveros MD   tadalafil (CIALIS) 5 MG tablet Take 1 tablet by mouth daily Yes Funmilayo Ontiveros MD   sildenafil (VIAGRA) 100 MG tablet take 1 tablet by mouth if needed for ERECTILE DYSFUNCTION Yes LUIS E Hamlin - CNP   cyclobenzaprine (FLEXERIL) 10 MG tablet Take 1 tablet by mouth nightly as needed for Muscle spasms Yes Funmilayo Ontiveros MD       Data Review  CBC:   Lab Results   Component Value Date    WBC 7.5 11/12/2016    WBC 0-5 11/12/2016    RBC 5.27 11/12/2016    RBC 0-5 11/12/2016     BMP:   Lab Results   Component Value Date    GLUCOSE 89 07/17/2018    GLUCOSE 89 06/17/2017    CO2 24 07/17/2018    BUN 16 07/17/2018    CREATININE 1.0 07/17/2018    CALCIUM 9.3 07/17/2018       Colonoscopy reviewed     Assessment/Plan:     1. Dyslipidemia (high LDL; low HDL)  - Lipid, Fasting; Future  - Comprehensive Metabolic Panel; Future    2. Screening for deficiency anemia  - CBC With Auto Differential; Future    3. Muscle spasm  - cyclobenzaprine (FLEXERIL) 10 MG tablet; Take 1 tablet by mouth nightly as needed for Muscle spasms  Dispense: 30 tablet; Refill: 2    4. Essential hypertension  - lisinopril (PRINIVIL;ZESTRIL) 20 MG tablet; take 1 tablet by mouth daily  Dispense: 90 tablet; Refill: 1    5. Primary osteoarthritis involving multiple joints  - diclofenac (VOLTAREN) 75 MG EC tablet; Take 1 tablet by mouth 2 times daily  Dispense: 60 tablet; Refill: 3    6. Sleeping difficulty  - Melatonin ER 10 MG TBCR; Take 10 mg by mouth every evening  Dispense: 30 tablet; Refill: 3  - diphenhydrAMINE (BENADRYL) 50 MG tablet; Take 1 tablet by mouth nightly as needed for Sleep  Dispense: 30 tablet; Refill: 3    7. Prostate cancer screening  - PSA Screening; Future    8.  Tobacco abuse  Counseling to quit - precontemplative             Electronically signed by Amberly Juan MD on 6/14/2019 at 9:52 AM

## 2019-06-18 ENCOUNTER — TELEPHONE (OUTPATIENT)
Dept: FAMILY MEDICINE CLINIC | Age: 54
End: 2019-06-18

## 2019-06-18 NOTE — TELEPHONE ENCOUNTER
[facility] melatonin EXTENDED RELEASE. Per Dr. Courtney Ignacio pt is to take two of the 5 mg xr. Rx cancelled at pharmacy by [facility] and a message was left for patient with next steps.

## 2019-07-19 ENCOUNTER — HOSPITAL ENCOUNTER (OUTPATIENT)
Age: 54
Setting detail: SPECIMEN
Discharge: HOME | End: 2019-07-19
Payer: COMMERCIAL

## 2019-07-19 DIAGNOSIS — Z13.0 SCREENING FOR DEFICIENCY ANEMIA: ICD-10-CM

## 2019-07-19 DIAGNOSIS — E78.5 DYSLIPIDEMIA (HIGH LDL; LOW HDL): ICD-10-CM

## 2019-07-19 DIAGNOSIS — Z12.5 PROSTATE CANCER SCREENING: ICD-10-CM

## 2019-07-19 LAB
ABSOLUTE EOS #: 0.09 K/UL (ref 0–0.44)
ABSOLUTE IMMATURE GRANULOCYTE: <0.03 K/UL (ref 0–0.3)
ABSOLUTE LYMPH #: 2 K/UL (ref 1.1–3.7)
ABSOLUTE MONO #: 0.51 K/UL (ref 0.1–1.2)
ALBUMIN SERPL-MCNC: 4.4 G/DL (ref 3.5–5.2)
ALBUMIN/GLOBULIN RATIO: 1.4 (ref 1–2.5)
ALP BLD-CCNC: 96 U/L (ref 40–129)
ALT SERPL-CCNC: 21 U/L (ref 5–41)
ANION GAP SERPL CALCULATED.3IONS-SCNC: 20 MMOL/L (ref 9–17)
AST SERPL-CCNC: 20 U/L
BASOPHILS # BLD: 0 % (ref 0–2)
BASOPHILS ABSOLUTE: 0.03 K/UL (ref 0–0.2)
BILIRUB SERPL-MCNC: 1.11 MG/DL (ref 0.3–1.2)
BUN BLDV-MCNC: 19 MG/DL (ref 6–20)
BUN/CREAT BLD: ABNORMAL (ref 9–20)
CALCIUM SERPL-MCNC: 9.4 MG/DL (ref 8.6–10.4)
CHLORIDE BLD-SCNC: 103 MMOL/L (ref 98–107)
CHOLESTEROL, FASTING: 179 MG/DL
CHOLESTEROL/HDL RATIO: 4.2
CO2: 19 MMOL/L (ref 20–31)
CREAT SERPL-MCNC: 1.02 MG/DL (ref 0.7–1.2)
DIFFERENTIAL TYPE: NORMAL
EOSINOPHILS RELATIVE PERCENT: 1 % (ref 1–4)
GFR AFRICAN AMERICAN: >60 ML/MIN
GFR NON-AFRICAN AMERICAN: >60 ML/MIN
GFR SERPL CREATININE-BSD FRML MDRD: ABNORMAL ML/MIN/{1.73_M2}
GFR SERPL CREATININE-BSD FRML MDRD: ABNORMAL ML/MIN/{1.73_M2}
GLUCOSE BLD-MCNC: 90 MG/DL (ref 70–99)
HCT VFR BLD CALC: 44.5 % (ref 40.7–50.3)
HDLC SERPL-MCNC: 43 MG/DL
HEMOGLOBIN: 14.4 G/DL (ref 13–17)
IMMATURE GRANULOCYTES: 0 %
LDL CHOLESTEROL: 119 MG/DL (ref 0–130)
LYMPHOCYTES # BLD: 29 % (ref 24–43)
MCH RBC QN AUTO: 29.8 PG (ref 25.2–33.5)
MCHC RBC AUTO-ENTMCNC: 32.4 G/DL (ref 28.4–34.8)
MCV RBC AUTO: 92.1 FL (ref 82.6–102.9)
MONOCYTES # BLD: 7 % (ref 3–12)
NRBC AUTOMATED: 0 PER 100 WBC
PDW BLD-RTO: 12.8 % (ref 11.8–14.4)
PLATELET # BLD: 268 K/UL (ref 138–453)
PLATELET ESTIMATE: NORMAL
PMV BLD AUTO: 10.2 FL (ref 8.1–13.5)
POTASSIUM SERPL-SCNC: 4.5 MMOL/L (ref 3.7–5.3)
PROSTATE SPECIFIC ANTIGEN: 0.6 UG/L
RBC # BLD: 4.83 M/UL (ref 4.21–5.77)
RBC # BLD: NORMAL 10*6/UL
SEG NEUTROPHILS: 63 % (ref 36–65)
SEGMENTED NEUTROPHILS ABSOLUTE COUNT: 4.27 K/UL (ref 1.5–8.1)
SODIUM BLD-SCNC: 142 MMOL/L (ref 135–144)
TOTAL PROTEIN: 7.5 G/DL (ref 6.4–8.3)
TRIGLYCERIDE, FASTING: 85 MG/DL
VLDLC SERPL CALC-MCNC: NORMAL MG/DL (ref 1–30)
WBC # BLD: 6.9 K/UL (ref 3.5–11.3)
WBC # BLD: NORMAL 10*3/UL

## 2019-09-10 DIAGNOSIS — N52.9 ERECTILE DYSFUNCTION, UNSPECIFIED ERECTILE DYSFUNCTION TYPE: ICD-10-CM

## 2019-09-10 NOTE — TELEPHONE ENCOUNTER
Last visit: 6/14/19  Last Med refill: 12/14/18  Does patient have enough medication for 72 hours: No:     Next Visit Date:  Future Appointments   Date Time Provider Guzman Aleman   12/18/2019  9:30 AM Funmilayo Moyer  [address] [facility]   Topic Date Due    Shingles Vaccine (1 of 2) 05/29/2017    Flu vaccine (1) 12/21/2019 (Originally 9/1/2019)    Pneumococcal 0-64 years Vaccine (1 of 1 - PPSV23) 06/14/2020 (Originally 5/29/1971)    Potassium monitoring  07/19/2020    Creatinine monitoring  07/19/2020    DTaP/Tdap/Td vaccine (2 - Td) 02/14/2022    Colon cancer screen colonoscopy  09/25/2022    Lipid screen  07/19/2024    HIV screen  Addressed       Hemoglobin A1C (%)   Date Value   07/17/2018 5.4   09/27/2014 5.8             ( goal A1C is < 7)   No results found for: LABMICR  LDL Cholesterol (mg/dL)   Date Value   07/19/2019 119     LDL Calculated (mg/dL)   Date Value   07/17/2018 105   06/17/2017 124       (goal LDL is <100)   AST (U/L)   Date Value   07/19/2019 20     ALT (U/L)   Date Value   07/19/2019 21     BUN (mg/dL)   Date Value   07/19/2019 19     BP Readings from Last 3 Encounters:   06/14/19 108/76   12/14/18 118/68   06/21/18 104/70          (goal 120/80)    All Future Testing planned in [facility]  Lab Frequency Next Occurrence               Patient Active Problem List:     Dyslipidemia (high LDL; low HDL)     Essential hypertension     Positive FIT (fecal immunochemical test)     Edema of colon     Terminal ileitis without complication (HCC)     Erectile dysfunction

## 2019-09-11 RX ORDER — TADALAFIL 5 MG/1
TABLET ORAL
Qty: 30 TABLET | Refills: 1 | Status: SHIPPED | OUTPATIENT
Start: 2019-09-11 | End: 2019-12-10 | Stop reason: SDUPTHER

## 2019-10-14 DIAGNOSIS — G47.9 SLEEPING DIFFICULTY: ICD-10-CM

## 2019-10-14 RX ORDER — PHENYLEPHRINE HCL 1 %
SPRAY, NON-AEROSOL (ML) NASAL
Qty: 60 TABLET | Refills: 5 | Status: SHIPPED | OUTPATIENT
Start: 2019-10-14 | End: 2020-06-04

## 2019-12-10 DIAGNOSIS — N52.9 ERECTILE DYSFUNCTION, UNSPECIFIED ERECTILE DYSFUNCTION TYPE: ICD-10-CM

## 2019-12-10 DIAGNOSIS — I10 ESSENTIAL HYPERTENSION: ICD-10-CM

## 2019-12-11 RX ORDER — TADALAFIL 5 MG/1
TABLET ORAL
Qty: 30 TABLET | Refills: 1 | Status: SHIPPED | OUTPATIENT
Start: 2019-12-11 | End: 2020-04-13

## 2019-12-11 RX ORDER — LISINOPRIL 20 MG/1
TABLET ORAL
Qty: 90 TABLET | Refills: 1 | Status: SHIPPED | OUTPATIENT
Start: 2019-12-11 | End: 2020-07-17

## 2019-12-18 ENCOUNTER — OFFICE VISIT (OUTPATIENT)
Dept: FAMILY MEDICINE CLINIC | Age: 54
End: 2019-12-18
Payer: COMMERCIAL

## 2019-12-18 ENCOUNTER — HOSPITAL ENCOUNTER (OUTPATIENT)
Age: 54
Setting detail: SPECIMEN
Discharge: HOME | End: 2019-12-18
Payer: COMMERCIAL

## 2019-12-18 VITALS
WEIGHT: 156.2 LBS | HEART RATE: 60 BPM | OXYGEN SATURATION: 98 % | SYSTOLIC BLOOD PRESSURE: 120 MMHG | DIASTOLIC BLOOD PRESSURE: 78 MMHG | BODY MASS INDEX: 24.52 KG/M2 | HEIGHT: 67 IN

## 2019-12-18 DIAGNOSIS — R52 GENERALIZED BODY ACHES: ICD-10-CM

## 2019-12-18 DIAGNOSIS — E78.5 DYSLIPIDEMIA (HIGH LDL; LOW HDL): ICD-10-CM

## 2019-12-18 DIAGNOSIS — Z72.0 TOBACCO ABUSE: ICD-10-CM

## 2019-12-18 DIAGNOSIS — I10 ESSENTIAL HYPERTENSION: Primary | ICD-10-CM

## 2019-12-18 LAB
% CKMB: 2 % (ref 0–3.5)
C-REACTIVE PROTEIN: 1.1 MG/L (ref 0–5)
CK MB: 2.5 NG/ML
CKMB INTERPRETATION: NORMAL
HCT VFR BLD CALC: 44.1 % (ref 40.7–50.3)
HEMOGLOBIN: 14.6 G/DL (ref 13–17)
MCH RBC QN AUTO: 29.9 PG (ref 25.2–33.5)
MCHC RBC AUTO-ENTMCNC: 33.1 G/DL (ref 28.4–34.8)
MCV RBC AUTO: 90.4 FL (ref 82.6–102.9)
NRBC AUTOMATED: 0 PER 100 WBC
PDW BLD-RTO: 12.7 % (ref 11.8–14.4)
PLATELET # BLD: 277 K/UL (ref 138–453)
PMV BLD AUTO: 9.6 FL (ref 8.1–13.5)
RBC # BLD: 4.88 M/UL (ref 4.21–5.77)
RHEUMATOID FACTOR: <10 IU/ML
SEDIMENTATION RATE, ERYTHROCYTE: 4 MM (ref 0–10)
TOTAL CK: 127 U/L (ref 39–308)
WBC # BLD: 9 K/UL (ref 3.5–11.3)

## 2019-12-18 PROCEDURE — 99214 OFFICE O/P EST MOD 30 MIN: CPT | Performed by: INTERNAL MEDICINE

## 2019-12-18 RX ORDER — MELOXICAM 15 MG/1
15 TABLET ORAL DAILY
Qty: 30 TABLET | Refills: 3 | Status: SHIPPED | OUTPATIENT
Start: 2019-12-18 | End: 2020-04-13

## 2019-12-19 LAB — ANTI-NUCLEAR ANTIBODY (ANA): NEGATIVE

## 2020-04-13 RX ORDER — MELOXICAM 15 MG/1
TABLET ORAL
Qty: 30 TABLET | Refills: 5 | Status: SHIPPED | OUTPATIENT
Start: 2020-04-13 | End: 2020-12-07

## 2020-04-13 RX ORDER — TADALAFIL 5 MG/1
TABLET ORAL
Qty: 30 TABLET | Refills: 1 | Status: SHIPPED | OUTPATIENT
Start: 2020-04-13 | End: 2021-03-18

## 2020-04-13 NOTE — TELEPHONE ENCOUNTER
Last visit: 12/18/19  Last Med refill: 12/11/19, 12/18/19  Does patient have enough medication for 72 hours: No:     Next Visit Date:  Future Appointments   Date Time Provider Guzman Aleman   6/22/2020  2:00 PM Funmilayo Boyd  [address] [facility]   Topic Date Due    Shingles Vaccine (1 of 2) 05/29/2017    Pneumococcal 0-64 years Vaccine (1 of 1 - PPSV23) 06/14/2020 (Originally 5/29/1971)    Flu vaccine (Season Ended) 12/18/2020 (Originally 9/1/2020)    Potassium monitoring  07/19/2020    Creatinine monitoring  07/19/2020    DTaP/Tdap/Td vaccine (2 - Td) 02/14/2022    Colon cancer screen colonoscopy  09/25/2022    Lipid screen  07/19/2024    HIV screen  Addressed    Hepatitis A vaccine  Aged Out    Hepatitis B vaccine  Aged Out    Hib vaccine  Aged Out    Meningococcal (ACWY) vaccine  Aged Out       Hemoglobin A1C (%)   Date Value   07/17/2018 5.4   09/27/2014 5.8             ( goal A1C is < 7)   No results found for: LABMICR  LDL Cholesterol (mg/dL)   Date Value   07/19/2019 119     LDL Calculated (mg/dL)   Date Value   07/17/2018 105   06/17/2017 124       (goal LDL is <100)   AST (U/L)   Date Value   07/19/2019 20     ALT (U/L)   Date Value   07/19/2019 21     BUN (mg/dL)   Date Value   07/19/2019 19     BP Readings from Last 3 Encounters:   12/18/19 120/78   06/14/19 108/76   12/14/18 118/68          (goal 120/80)    All Future Testing planned in [facility]  Lab Frequency Next Occurrence               Patient Active Problem List:     Dyslipidemia (high LDL; low HDL)     Essential hypertension     Positive FIT (fecal immunochemical test)     Edema of colon     Terminal ileitis without complication (HCC)     Erectile dysfunction

## 2020-06-04 RX ORDER — PHENYLEPHRINE HCL 1 %
SPRAY, NON-AEROSOL (ML) NASAL
Qty: 60 TABLET | Refills: 5 | Status: SHIPPED | OUTPATIENT
Start: 2020-06-04 | End: 2021-02-17

## 2020-07-17 RX ORDER — LISINOPRIL 20 MG/1
TABLET ORAL
Qty: 90 TABLET | Refills: 1 | Status: SHIPPED | OUTPATIENT
Start: 2020-07-17 | End: 2021-02-17

## 2020-09-02 ENCOUNTER — OFFICE VISIT (OUTPATIENT)
Dept: FAMILY MEDICINE CLINIC | Age: 55
End: 2020-09-02
Payer: COMMERCIAL

## 2020-09-02 VITALS
SYSTOLIC BLOOD PRESSURE: 122 MMHG | BODY MASS INDEX: 23.96 KG/M2 | DIASTOLIC BLOOD PRESSURE: 80 MMHG | TEMPERATURE: 98.4 F | WEIGHT: 153 LBS | OXYGEN SATURATION: 99 % | HEART RATE: 63 BPM

## 2020-09-02 PROBLEM — Z72.0 TOBACCO ABUSE: Status: ACTIVE | Noted: 2020-09-02

## 2020-09-02 PROCEDURE — 99214 OFFICE O/P EST MOD 30 MIN: CPT | Performed by: INTERNAL MEDICINE

## 2020-09-02 RX ORDER — CYCLOBENZAPRINE HCL 10 MG
10 TABLET ORAL NIGHTLY PRN
Qty: 30 TABLET | Refills: 2 | Status: SHIPPED | OUTPATIENT
Start: 2020-09-02 | End: 2021-08-16

## 2020-09-02 ASSESSMENT — PATIENT HEALTH QUESTIONNAIRE - PHQ9
SUM OF ALL RESPONSES TO PHQ QUESTIONS 1-9: 0
1. LITTLE INTEREST OR PLEASURE IN DOING THINGS: 0
SUM OF ALL RESPONSES TO PHQ QUESTIONS 1-9: 0
2. FEELING DOWN, DEPRESSED OR HOPELESS: 0
SUM OF ALL RESPONSES TO PHQ9 QUESTIONS 1 & 2: 0

## 2020-09-02 ASSESSMENT — ENCOUNTER SYMPTOMS
CONSTIPATION: 0
WHEEZING: 0
BLOOD IN STOOL: 0
CHEST TIGHTNESS: 0
ABDOMINAL PAIN: 0
ANAL BLEEDING: 0
CHOKING: 0
ORTHOPNEA: 0
DIARRHEA: 0
COUGH: 0
SHORTNESS OF BREATH: 0
VOMITING: 0
BLURRED VISION: 0
NAUSEA: 0

## 2020-09-02 ASSESSMENT — VISUAL ACUITY: OU: 1

## 2020-09-02 NOTE — PROGRESS NOTES
Pt is here today for a 6 month follow up. He is requesting. refill on Flexeril. He has declined the flu vaccine. He is having some RT arm pain and discomfort.

## 2020-09-02 NOTE — PROGRESS NOTES
Subjective:       Patient ID:     Evonne Alicea is a 50 y.o. male who presents for   Chief Complaint   Patient presents with    Hypertension       HPI:  Nursing note reviewed and discussed with patient. Hypertension   This is a chronic problem. The current episode started more than 1 year ago. The problem is unchanged. The problem is controlled. Pertinent negatives include no anxiety, blurred vision, chest pain, headaches, malaise/fatigue, neck pain, orthopnea, palpitations, peripheral edema, PND, shortness of breath or sweats. There are no associated agents to hypertension. Risk factors for coronary artery disease include male gender, smoking/tobacco exposure and sedentary lifestyle. Past treatments include ACE inhibitors. Still vaping - slightly lower nicotine level   Remains on melatonin and benadryl at night, sleeping well once he falls asleep. He hurt his right arm in May, right upper arm has been swollen in the middle ever since. The forearm was bruised for aw hile after, the bruising is now resolved. Patient's medications, allergies, past medical, surgical, social and family histories were reviewed and updated as appropriate. Past Medical History:   Diagnosis Date    Hypertension      Past Surgical History:   Procedure Laterality Date    COLONOSCOPY  09/25/2017    erosions; edema and patchy acute inflammation    MD COLONOSCOPY W/BIOPSY SINGLE/MULTIPLE N/A 9/25/2017    COLONOSCOPY WITH BIOPSY performed by Addi Hennessy MD at [address] [place] [address] History     Tobacco Use    Smoking status: Current Every Day Smoker     Packs/day: 1.00     Years: 28.00     Pack years: 28.00     Types: Cigarettes     Last attempt to quit: 11/1/2016     Years since quitting: 3.8    Smokeless tobacco: Never Used   Substance Use Topics    Alcohol use:  Yes     Alcohol/week: 10.0 standard drinks     Types: 12 Standard drinks or equivalent per week     Comment: rare      Patient Active Problem List   Diagnosis  Dyslipidemia (high LDL; low HDL)    Essential hypertension    Positive FIT (fecal immunochemical test)    Edema of colon    Terminal ileitis without complication (HCC)    Erectile dysfunction         Prior to Visit Medications    Medication Sig Taking? Authorizing Provider   lisinopril (PRINIVIL;ZESTRIL) 20 MG tablet take 1 tablet by mouth once daily Yes Funmilayo Mueller MD   RA ALLERGY 25 MG tablet take 2 tablets by mouth every evening if needed for sleep Yes Funmilayo Mueller MD   meloxicam (MOBIC) 15 MG tablet take 1 tablet by mouth once daily Yes Funmilayo Mueller MD   tadalafil (CIALIS) 5 MG tablet take 1 tablet by mouth if needed for ERECTILE DYSFUNCTION Yes Funmilayo Mueller MD   cyclobenzaprine (FLEXERIL) 10 MG tablet Take 1 tablet by mouth nightly as needed for Muscle spasms Yes Funmilayo Mueller MD   Melatonin ER 10 MG TBCR Take 10 mg by mouth every evening Yes Oscar Arenas MD     Review of Systems  Review of Systems   Constitutional: Negative for fatigue, fever, malaise/fatigue and unexpected weight change. Eyes: Negative for blurred vision. Respiratory: Negative for cough, choking, chest tightness, shortness of breath and wheezing. Cardiovascular: Negative for chest pain, palpitations, orthopnea, leg swelling and PND. Gastrointestinal: Negative for abdominal pain, anal bleeding, blood in stool, constipation, diarrhea, nausea and vomiting. Endocrine: Negative. Musculoskeletal: Negative for joint swelling, myalgias and neck pain. Skin: Negative. Neurological: Negative for dizziness and headaches. Psychiatric/Behavioral: Negative for sleep disturbance. All other systems reviewed and are negative. Objective:       Physical Exam:  /80 (Site: Left Upper Arm, Position: Sitting, Cuff Size: Medium Adult)   Pulse 63   Temp 98.4 °F (36.9 °C) (Temporal)   Wt 153 lb (69.4 kg)   SpO2 99%   BMI 23.96 kg/m²   Physical Exam  Vitals signs and nursing note reviewed. Constitutional:       General: He is not in acute distress. Appearance: He is well-developed. He is not ill-appearing. Eyes:      General: Lids are normal. Vision grossly intact. Cardiovascular:      Rate and Rhythm: Normal rate and regular rhythm. Heart sounds: Normal heart sounds, S1 normal and S2 normal. No murmur. No friction rub. No gallop. Pulmonary:      Effort: Pulmonary effort is normal. No respiratory distress. Breath sounds: Normal breath sounds. No wheezing. Abdominal:      General: Bowel sounds are normal.      Palpations: Abdomen is soft. There is no mass. Tenderness: There is no abdominal tenderness. There is no guarding. Musculoskeletal: Normal range of motion. Right shoulder: He exhibits deformity. Arms:       Comments: Swelling in middle of right upper arm, concern for bicep tendon rupture  Decreased flexion strength at R shoulder    Skin:     General: Skin is warm and dry. Capillary Refill: Capillary refill takes less than 2 seconds. Neurological:      General: No focal deficit present. Mental Status: He is alert and oriented to person, place, and time. Data Review  No visits with results within 6 Month(s) from this visit. Latest known visit with results is:   Hospital Outpatient Visit on 12/18/2019   Component Date Value    Total CK 12/18/2019 127     CK-MB 12/18/2019 2.5     % CKMB 12/18/2019 2.0     CKMB Interpretation 12/18/2019 NORMAL ISOENZYME PATTERN     WBC 12/18/2019 9.0     RBC 12/18/2019 4.88     Hemoglobin 12/18/2019 14.6     Hematocrit 12/18/2019 44.1     MCV 12/18/2019 90.4     MCH 12/18/2019 29.9     MCHC 12/18/2019 33.1     RDW 12/18/2019 12.7     Platelets 23/75/4536 277     MPV 12/18/2019 9.6     NRBC Automated 12/18/2019 0.0     CRP 12/18/2019 1.1     Sed Rate 12/18/2019 4     KELVIN 12/18/2019 NEGATIVE     Rheumatoid Factor 12/18/2019 <10           Assessment/Plan:      1.  Muscle spasm  - cyclobenzaprine (FLEXERIL) 10 MG tablet; Take 1 tablet by mouth nightly as needed for Muscle spasms  Dispense: 30 tablet; Refill: 2    2. Essential hypertension  Continue lisinopril   - Comprehensive Metabolic Panel; Future    3. Dyslipidemia (high LDL; low HDL)  Continue diet and lifestyle management   - Lipid, Fasting; Future  - Comprehensive Metabolic Panel; Future    4. Erectile dysfunction, unspecified erectile dysfunction type  Continue current management     5. Terminal ileitis without complication ([facility] [address]  Resolved - repeat colonoscopy 2022    6. Screening for thyroid disorder  - CBC With Auto Differential; Future    7. Screening, anemia, deficiency, iron  - TSH With Reflex Ft4; Future    8. Tobacco abuse  Not ready to quit at this time        9.  Injury of tendon of long head of right biceps, initial encounter  - Phyllis Montoya DO, Orthopedic Surgery, [facility] Maintenance Due   Topic Date Due    Shingles Vaccine (1 of 2) 05/29/2017    Potassium monitoring  07/19/2020    Creatinine monitoring  07/19/2020       Electronically signed by Rita Chiu MD on 9/2/2020 at 3:00 PM

## 2020-09-10 ENCOUNTER — TELEPHONE (OUTPATIENT)
Dept: FAMILY MEDICINE CLINIC | Age: 55
End: 2020-09-10

## 2020-09-10 ENCOUNTER — HOSPITAL ENCOUNTER (OUTPATIENT)
Age: 55
Setting detail: SPECIMEN
Discharge: HOME | End: 2020-09-10
Payer: COMMERCIAL

## 2020-09-10 LAB
ABSOLUTE EOS #: 0.08 K/UL (ref 0–0.44)
ABSOLUTE IMMATURE GRANULOCYTE: 0.04 K/UL (ref 0–0.3)
ABSOLUTE LYMPH #: 2.22 K/UL (ref 1.1–3.7)
ABSOLUTE MONO #: 0.63 K/UL (ref 0.1–1.2)
ALBUMIN SERPL-MCNC: 4.4 G/DL (ref 3.5–5.2)
ALBUMIN/GLOBULIN RATIO: 1.6 (ref 1–2.5)
ALP BLD-CCNC: 70 U/L (ref 40–129)
ALT SERPL-CCNC: 18 U/L (ref 5–41)
ANION GAP SERPL CALCULATED.3IONS-SCNC: 11 MMOL/L (ref 9–17)
AST SERPL-CCNC: 19 U/L
BASOPHILS # BLD: 0 % (ref 0–2)
BASOPHILS ABSOLUTE: <0.03 K/UL (ref 0–0.2)
BILIRUB SERPL-MCNC: 0.86 MG/DL (ref 0.3–1.2)
BUN BLDV-MCNC: 15 MG/DL (ref 6–20)
BUN/CREAT BLD: NORMAL (ref 9–20)
CALCIUM SERPL-MCNC: 9.4 MG/DL (ref 8.6–10.4)
CHLORIDE BLD-SCNC: 104 MMOL/L (ref 98–107)
CHOLESTEROL, FASTING: 178 MG/DL
CHOLESTEROL/HDL RATIO: 3.5
CO2: 25 MMOL/L (ref 20–31)
CREAT SERPL-MCNC: 0.94 MG/DL (ref 0.7–1.2)
DIFFERENTIAL TYPE: ABNORMAL
EOSINOPHILS RELATIVE PERCENT: 1 % (ref 1–4)
GFR AFRICAN AMERICAN: >60 ML/MIN
GFR NON-AFRICAN AMERICAN: >60 ML/MIN
GFR SERPL CREATININE-BSD FRML MDRD: NORMAL ML/MIN/{1.73_M2}
GFR SERPL CREATININE-BSD FRML MDRD: NORMAL ML/MIN/{1.73_M2}
GLUCOSE BLD-MCNC: 88 MG/DL (ref 70–99)
HCT VFR BLD CALC: 43.8 % (ref 40.7–50.3)
HDLC SERPL-MCNC: 51 MG/DL
HEMOGLOBIN: 14.3 G/DL (ref 13–17)
IMMATURE GRANULOCYTES: 1 %
LDL CHOLESTEROL: 112 MG/DL (ref 0–130)
LYMPHOCYTES # BLD: 25 % (ref 24–43)
MCH RBC QN AUTO: 29.8 PG (ref 25.2–33.5)
MCHC RBC AUTO-ENTMCNC: 32.6 G/DL (ref 28.4–34.8)
MCV RBC AUTO: 91.3 FL (ref 82.6–102.9)
MONOCYTES # BLD: 7 % (ref 3–12)
NRBC AUTOMATED: 0 PER 100 WBC
PDW BLD-RTO: 13.1 % (ref 11.8–14.4)
PLATELET # BLD: 253 K/UL (ref 138–453)
PLATELET ESTIMATE: ABNORMAL
PMV BLD AUTO: 9.3 FL (ref 8.1–13.5)
POTASSIUM SERPL-SCNC: 4.1 MMOL/L (ref 3.7–5.3)
RBC # BLD: 4.8 M/UL (ref 4.21–5.77)
RBC # BLD: ABNORMAL 10*6/UL
SEG NEUTROPHILS: 66 % (ref 36–65)
SEGMENTED NEUTROPHILS ABSOLUTE COUNT: 5.76 K/UL (ref 1.5–8.1)
SODIUM BLD-SCNC: 140 MMOL/L (ref 135–144)
TOTAL PROTEIN: 7.2 G/DL (ref 6.4–8.3)
TRIGLYCERIDE, FASTING: 76 MG/DL
TSH SERPL DL<=0.05 MIU/L-ACNC: 1.49 MIU/L (ref 0.3–5)
VLDLC SERPL CALC-MCNC: NORMAL MG/DL (ref 1–30)
WBC # BLD: 8.8 K/UL (ref 3.5–11.3)
WBC # BLD: ABNORMAL 10*3/UL

## 2020-09-21 ENCOUNTER — OFFICE VISIT (OUTPATIENT)
Dept: ORTHOPEDIC SURGERY | Age: 55
End: 2020-09-21
Payer: COMMERCIAL

## 2020-09-21 VITALS — TEMPERATURE: 98.1 F | BODY MASS INDEX: 24.91 KG/M2 | WEIGHT: 155 LBS | HEIGHT: 66 IN

## 2020-09-21 PROBLEM — S46.211A RUPTURE OF RIGHT DISTAL BICEPS TENDON: Status: ACTIVE | Noted: 2020-09-21

## 2020-09-21 PROCEDURE — 99203 OFFICE O/P NEW LOW 30 MIN: CPT | Performed by: ORTHOPAEDIC SURGERY

## 2020-09-21 NOTE — LETTER
9/21/2020    Funmilayo Mueller MD  [address] [place] [address], [address]    RE: Andrena Councilman. Dear Dr. Iron Mueller,    Thank you for allowing me to participate in the care of Mr. Katia Rivera. I had the opportunity to evaluate the patient on 9/21/2020. Attached you will find my evaluation and recommendations. Thanks again for the confidence you have expressed in me by allowing my participation in the care of your patient. I will keep you apprised of further developments in the patients treatment course as it progresses. If I can be of further assistance in any fashion, please feel free to contact me at your convenience.     Sincerely,        Wiliam Dominique  Shoulder and Elbow Surgery

## 2020-09-21 NOTE — PROGRESS NOTES
drinks of alcohol per week. He reports that he does not use drugs. Family History  Gael's family history includes Heart Disease in his father. Review of Systems   History obtained from the patient. REVIEW OF SYSTEMS:   Constitution: negative for fever, chills, weight loss or malaise   Musculoskeletal: As noted in the HPI   Neurologic: As noted in the HPI    Physical Exam  Temp 98.1 °F (36.7 °C) (Infrared)   Ht 5' 6\" (1.676 m)   Wt 155 lb (70.3 kg)   BMI 25.02 kg/m²    General Appearance: alert, well appearing, and in no distress  Mental Status: alert, oriented to person, place, and time  Evaluation patient's right elbow and upper extremity demonstrates intact skin without warmth, erythema, or notable swelling. Sensation is grossly intact light touch in all dermatomes bilaterally and he has 2+ radial pulses bilaterally. He has full elbow extension with flexion to 140 degrees and supination and pronation of 80 degrees in both directions. Again this is symmetric to the contralateral side. He has some weakness with resisted supination on the right compared to the left. He has a positive hook's test on the right. He is nontender to palpation diffusely about the elbow. He has no instability with varus and valgus stress applied. Imaging Studies  3 x-ray views of the right elbow completed on 9/21/2020 were reviewed demonstrating no obvious fracture, dislocation, subluxation or obvious osseous abnormality. Assessment and Plan  Leticia Esteves is a 50 y.o. 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 I had a discussion with the patient today educating him about this injury and discussing treatment options including nonoperative and operative intervention.   At this time he indicates that he is essentially back to all of his regular activities and is having no problems with this elbow except for when he lifts anything over 50 to 70 pounds at which time he experiences some pain. But he states that this happens infrequently. Consequently following an in-depth discussion with regards to the nature of this injury and expected limitations as a result of this injury he is electing to continue to monitor things proceeding with conservative management. As a result of him can have him follow-up my clinic as needed but certainly should he have any issues he was encouraged to return for further evaluation and treatment. Should he opt to proceed with surgery at this point it will likely be a biceps tendon repair with use of allograft.

## 2020-12-07 RX ORDER — MELOXICAM 15 MG/1
TABLET ORAL
Qty: 90 TABLET | Refills: 1 | Status: SHIPPED | OUTPATIENT
Start: 2020-12-07 | End: 2021-08-16 | Stop reason: SDUPTHER

## 2020-12-07 NOTE — TELEPHONE ENCOUNTER
Last visit: 9/2/20  Last Med refill: 4/13/20  Does patient have enough medication for 72 hours: Yes    Next Visit Date:  Future Appointments   Date Time Provider Guzman Love   3/3/2021  2:00 PM Funmilayo Meléndez  [address] Maintenance   Topic Date Due    Pneumococcal 0-64 years Vaccine (1 of 1 - PPSV23) 05/29/1973    Shingles Vaccine (1 of 2) 05/29/2017    Flu vaccine (1) 09/01/2020    Potassium monitoring  09/10/2021    Creatinine monitoring  09/10/2021    DTaP/Tdap/Td vaccine (2 - Td) 02/14/2022    Colon cancer screen colonoscopy  09/25/2022    Lipid screen  09/10/2025    HIV screen  Addressed    Hepatitis A vaccine  Aged Out    Hepatitis B vaccine  Aged Out    Hib vaccine  Aged Out    Meningococcal (ACWY) vaccine  Aged Out       Hemoglobin A1C (%)   Date Value   07/17/2018 5.4   09/27/2014 5.8             ( goal A1C is < 7)   No results found for: LABMICR  LDL Cholesterol (mg/dL)   Date Value   09/10/2020 112   07/19/2019 119     LDL Calculated (mg/dL)   Date Value   07/17/2018 105   06/17/2017 124       (goal LDL is <100)   AST (U/L)   Date Value   09/10/2020 19     ALT (U/L)   Date Value   09/10/2020 18     BUN (mg/dL)   Date Value   09/10/2020 15     BP Readings from Last 3 Encounters:   09/02/20 122/80   12/18/19 120/78   06/14/19 108/76          (goal 120/80)    All Future Testing planned in [facility]  Lab Frequency Next Occurrence               Patient Active Problem List:     Dyslipidemia (high LDL; low HDL)     Essential hypertension     Positive FIT (fecal immunochemical test)     Edema of colon     Terminal ileitis without complication (HCC)     Erectile dysfunction     Tobacco abuse     Rupture of right distal biceps tendon

## 2021-02-15 DIAGNOSIS — G47.9 SLEEPING DIFFICULTY: ICD-10-CM

## 2021-02-15 DIAGNOSIS — I10 ESSENTIAL HYPERTENSION: ICD-10-CM

## 2021-02-15 NOTE — TELEPHONE ENCOUNTER
Last visit: 09/02/2020  Last Med refill: 01/17/2021  Does patient have enough medication for 72 hours: No:     Next Visit Date:  Future Appointments   Date Time Provider Guzman Aleman   3/3/2021  2:00 PM Funmilayo Torrez  [address] Maintenance   Topic Date Due    Hepatitis C screen  1965    Pneumococcal 0-64 years Vaccine (1 of 1 - PPSV23) 05/29/1973    COVID-19 Vaccine (1 of 2) 05/29/1983    Shingles Vaccine (1 of 2) 05/29/2017    Flu vaccine (1) 09/01/2020    Potassium monitoring  09/10/2021    Creatinine monitoring  09/10/2021    DTaP/Tdap/Td vaccine (2 - Td) 02/14/2022    Colon cancer screen colonoscopy  09/25/2022    Lipid screen  09/10/2025    HIV screen  Addressed    Hepatitis A vaccine  Aged Out    Hepatitis B vaccine  Aged Out    Hib vaccine  Aged Out    Meningococcal (ACWY) vaccine  Aged Out       Hemoglobin A1C (%)   Date Value   07/17/2018 5.4   09/27/2014 5.8             ( goal A1C is < 7)   No results found for: LABMICR  LDL Cholesterol (mg/dL)   Date Value   09/10/2020 112   07/19/2019 119     LDL Calculated (mg/dL)   Date Value   07/17/2018 105   06/17/2017 124       (goal LDL is <100)   AST (U/L)   Date Value   09/10/2020 19     ALT (U/L)   Date Value   09/10/2020 18     BUN (mg/dL)   Date Value   09/10/2020 15     BP Readings from Last 3 Encounters:   09/02/20 122/80   12/18/19 120/78   06/14/19 108/76          (goal 120/80)    All Future Testing planned in [facility]  Lab Frequency Next Occurrence               Patient Active Problem List:     Dyslipidemia (high LDL; low HDL)     Essential hypertension     Positive FIT (fecal immunochemical test)     Edema of colon     Terminal ileitis without complication (HCC)     Erectile dysfunction     Tobacco abuse     Rupture of right distal biceps tendon

## 2021-02-17 RX ORDER — LISINOPRIL 20 MG/1
TABLET ORAL
Qty: 90 TABLET | Refills: 1 | Status: SHIPPED | OUTPATIENT
Start: 2021-02-17 | End: 2021-08-16

## 2021-02-17 RX ORDER — PHENYLEPHRINE HCL 1 %
SPRAY, NON-AEROSOL (ML) NASAL
Qty: 60 TABLET | Refills: 5 | Status: SHIPPED | OUTPATIENT
Start: 2021-02-17 | End: 2021-12-16

## 2021-03-03 ENCOUNTER — OFFICE VISIT (OUTPATIENT)
Dept: FAMILY MEDICINE CLINIC | Age: 56
End: 2021-03-03
Payer: COMMERCIAL

## 2021-03-03 VITALS
HEART RATE: 65 BPM | WEIGHT: 151 LBS | OXYGEN SATURATION: 99 % | TEMPERATURE: 98.2 F | SYSTOLIC BLOOD PRESSURE: 118 MMHG | BODY MASS INDEX: 24.37 KG/M2 | DIASTOLIC BLOOD PRESSURE: 80 MMHG

## 2021-03-03 DIAGNOSIS — E78.5 DYSLIPIDEMIA (HIGH LDL; LOW HDL): ICD-10-CM

## 2021-03-03 DIAGNOSIS — G47.26 SHIFT WORK SLEEP DISORDER: ICD-10-CM

## 2021-03-03 DIAGNOSIS — K50.00 TERMINAL ILEITIS WITHOUT COMPLICATION (HCC): ICD-10-CM

## 2021-03-03 DIAGNOSIS — N52.9 ERECTILE DYSFUNCTION, UNSPECIFIED ERECTILE DYSFUNCTION TYPE: ICD-10-CM

## 2021-03-03 DIAGNOSIS — Z72.0 TOBACCO ABUSE: ICD-10-CM

## 2021-03-03 DIAGNOSIS — I10 ESSENTIAL HYPERTENSION: Primary | ICD-10-CM

## 2021-03-03 PROCEDURE — G8427 DOCREV CUR MEDS BY ELIG CLIN: HCPCS | Performed by: INTERNAL MEDICINE

## 2021-03-03 PROCEDURE — 4004F PT TOBACCO SCREEN RCVD TLK: CPT | Performed by: INTERNAL MEDICINE

## 2021-03-03 PROCEDURE — G8420 CALC BMI NORM PARAMETERS: HCPCS | Performed by: INTERNAL MEDICINE

## 2021-03-03 PROCEDURE — 99214 OFFICE O/P EST MOD 30 MIN: CPT | Performed by: INTERNAL MEDICINE

## 2021-03-03 PROCEDURE — 3017F COLORECTAL CA SCREEN DOC REV: CPT | Performed by: INTERNAL MEDICINE

## 2021-03-03 PROCEDURE — G8484 FLU IMMUNIZE NO ADMIN: HCPCS | Performed by: INTERNAL MEDICINE

## 2021-03-03 SDOH — ECONOMIC STABILITY: FOOD INSECURITY: WITHIN THE PAST 12 MONTHS, YOU WORRIED THAT YOUR FOOD WOULD RUN OUT BEFORE YOU GOT MONEY TO BUY MORE.: NEVER TRUE

## 2021-03-03 SDOH — ECONOMIC STABILITY: TRANSPORTATION INSECURITY
IN THE PAST 12 MONTHS, HAS THE LACK OF TRANSPORTATION KEPT YOU FROM MEDICAL APPOINTMENTS OR FROM GETTING MEDICATIONS?: NO

## 2021-03-03 SDOH — ECONOMIC STABILITY: FOOD INSECURITY: WITHIN THE PAST 12 MONTHS, THE FOOD YOU BOUGHT JUST DIDN'T LAST AND YOU DIDN'T HAVE MONEY TO GET MORE.: NEVER TRUE

## 2021-03-03 SDOH — ECONOMIC STABILITY: TRANSPORTATION INSECURITY
IN THE PAST 12 MONTHS, HAS LACK OF TRANSPORTATION KEPT YOU FROM MEETINGS, WORK, OR FROM GETTING THINGS NEEDED FOR DAILY LIVING?: NO

## 2021-03-03 ASSESSMENT — ENCOUNTER SYMPTOMS
COUGH: 0
BLURRED VISION: 0
SHORTNESS OF BREATH: 0
CONSTIPATION: 0
NAUSEA: 0
CHOKING: 0
BLOOD IN STOOL: 0
WHEEZING: 0
CHEST TIGHTNESS: 0
ANAL BLEEDING: 0
VOMITING: 0
DIARRHEA: 0
ORTHOPNEA: 0
ABDOMINAL PAIN: 0

## 2021-03-03 ASSESSMENT — VISUAL ACUITY: OU: 1

## 2021-03-03 ASSESSMENT — PATIENT HEALTH QUESTIONNAIRE - PHQ9
SUM OF ALL RESPONSES TO PHQ9 QUESTIONS 1 & 2: 0
SUM OF ALL RESPONSES TO PHQ QUESTIONS 1-9: 0

## 2021-03-03 NOTE — PATIENT INSTRUCTIONS
Patient Education        Stopping Smoking: Care Instructions  Your Care Instructions     Cigarette smokers crave the nicotine in cigarettes. Giving it up is much harder than simply changing a habit. Your body has to stop craving the nicotine. It is hard to quit, but you can do it. There are many tools that people use to quit smoking. You may find that combining tools works best for you. There are several steps to quitting. First you get ready to quit. Then you get support to help you. After that, you learn new skills and behaviors to become a nonsmoker. For many people, a necessary step is getting and using medicine. Your doctor will help you set up the plan that best meets your needs. You may want to attend a smoking cessation program to help you quit smoking. When you choose a program, look for one that has proven success. Ask your doctor for ideas. You will greatly increase your chances of success if you take medicine as well as get counseling or join a cessation program.  Some of the changes you feel when you first quit tobacco are uncomfortable. Your body will miss the nicotine at first, and you may feel short-tempered and grumpy. You may have trouble sleeping or concentrating. Medicine can help you deal with these symptoms. You may struggle with changing your smoking habits and rituals. The last step is the tricky one: Be prepared for the smoking urge to continue for a time. This is a lot to deal with, but keep at it. You will feel better. Follow-up care is a key part of your treatment and safety. Be sure to make and go to all appointments, and call your doctor if you are having problems. It's also a good idea to know your test results and keep a list of the medicines you take. How can you care for yourself at home? · Ask your family, friends, and coworkers for support. You have a better chance of quitting if you have help and support.   · Join a support group, such as Nicotine Anonymous, for people who are trying to quit smoking. · Consider signing up for a smoking cessation program, such as the American Lung Association's Freedom from Smoking program.  · Get text messaging support. Go to the website at www.smokefree. gov to sign up for the [facility] program.  · Set a quit date. Pick your date carefully so that it is not right in the middle of a big deadline or stressful time. Once you quit, do not even take a puff. Get rid of all ashtrays and lighters after your last cigarette. Clean your house and your clothes so that they do not smell of smoke. · Learn how to be a nonsmoker. Think about ways you can avoid those things that make you reach for a cigarette. ? Avoid situations that put you at greatest risk for smoking. For some people, it is hard to have a drink with friends without smoking. For others, they might skip a coffee break with coworkers who smoke. ? Change your daily routine. Take a different route to work or eat a meal in a different place. · Cut down on stress. Calm yourself or release tension by doing an activity you enjoy, such as reading a book, taking a hot bath, or gardening. · Talk to your doctor or pharmacist about nicotine replacement therapy, which replaces the nicotine in your body. You still get nicotine but you do not use tobacco. Nicotine replacement products help you slowly reduce the amount of nicotine you need. These products come in several forms, many of them available over-the-counter:  ? Nicotine patches  ? Nicotine gum and lozenges  ? Nicotine inhaler  · Ask your doctor about bupropion (Wellbutrin) or varenicline (Chantix), which are prescription medicines. They do not contain nicotine. They help you by reducing withdrawal symptoms, such as stress and anxiety. · Some people find hypnosis, acupuncture, and massage helpful for ending the smoking habit. · Eat a healthy diet and get regular exercise.  Having healthy habits will help your body move past its craving for nicotine. · Be prepared to keep trying. Most people are not successful the first few times they try to quit. Do not get mad at yourself if you smoke again. Make a list of things you learned and think about when you want to try again, such as next week, next month, or next year. Where can you learn more? Go to https://[employer]. org and sign in to your [employer] account. Enter I275 in the [employer] box to learn more about \"Stopping Smoking: Care Instructions. \"     If you do not have an account, please click on the \"Sign Up Now\" link. Current as of: March 12, 2020               Content Version: 12.6  © 8093-3567 [employer]. Care instructions adapted under license by [facility] [address] If you have questions about a medical condition or this instruction, always ask your healthcare professional. [place] [address] any warranty or liability for your use of this information.

## 2021-03-03 NOTE — PROGRESS NOTES
Subjective:       Patient ID:     Keiko Heaton is a 50 y.o. male who presents for   Chief Complaint   Patient presents with    Hypertension       HPI:  Nursing note reviewed and discussed with patient. Hypertension  This is a chronic problem. The current episode started more than 1 year ago. The problem is unchanged. The problem is controlled. Pertinent negatives include no anxiety, blurred vision, chest pain, headaches, malaise/fatigue, neck pain, orthopnea, palpitations, peripheral edema, PND, shortness of breath or sweats. Agents associated with hypertension include NSAIDs. Risk factors for coronary artery disease include dyslipidemia, male gender, smoking/tobacco exposure and sedentary lifestyle. Past treatments include ACE inhibitors. Compliance problems include exercise. Vaping now, he is using 4mls a day. No cigarettes. Sleeping schedule is weird, working 9p-5a so sleeping 2-3 hours in the morning and 2-3 hours in the evening, sleeping all night on weekends and up all day. Patient's medications, allergies, past medical, surgical, social and family histories were reviewed and updated as appropriate. Past Medical History:   Diagnosis Date    Hypertension      Past Surgical History:   Procedure Laterality Date    COLONOSCOPY  2017    erosions; edema and patchy acute inflammation    [place] COLONOSCOPY W/BIOPSY SINGLE/MULTIPLE N/A 2017    COLONOSCOPY WITH BIOPSY performed by Poonam Potter MD at [address] History     Tobacco Use    Smoking status: Current Every Day Smoker     Packs/day: 1.00     Years: 28.00     Pack years: 28.00     Types: Cigarettes     Last attempt to quit: 2016     Years since quittin.3    Smokeless tobacco: Never Used   Substance Use Topics    Alcohol use:  Yes     Alcohol/week: 10.0 standard drinks     Types: 12 Standard drinks or equivalent per week     Comment: rare      Patient Active Problem List   Diagnosis    Dyslipidemia (high LDL; low HDL)    Essential hypertension    Positive FIT (fecal immunochemical test)    Edema of colon    Terminal ileitis without complication (HCC)    Erectile dysfunction    Tobacco abuse    Rupture of right distal biceps tendon         Prior to Visit Medications    Medication Sig Taking? Authorizing Provider   RA ALLERGY 25 MG tablet take 2 tablets by mouth every evening if needed for sleep Yes Funmilayo Graham MD   lisinopril (PRINIVIL;ZESTRIL) 20 MG tablet take 1 tablet by mouth once daily Yes Funmilayo Graham MD   meloxicam (MOBIC) 15 MG tablet take 1 tablet by mouth once daily Yes Alejandra Lo APRN - CNP   cyclobenzaprine (FLEXERIL) 10 MG tablet Take 1 tablet by mouth nightly as needed for Muscle spasms Yes Funmilayo Graham MD   tadalafil (CIALIS) 5 MG tablet take 1 tablet by mouth if needed for ERECTILE DYSFUNCTION Yes Funmilayo Graham MD   Melatonin ER 10 MG TBCR Take 10 mg by mouth every evening Yes Taiwo Denson MD     Review of Systems  Review of Systems   Constitutional: Negative for fatigue, fever, malaise/fatigue and unexpected weight change. Eyes: Negative for blurred vision. Respiratory: Negative for cough, choking, chest tightness, shortness of breath and wheezing. Cardiovascular: Negative for chest pain, palpitations, orthopnea, leg swelling and PND. Gastrointestinal: Negative for abdominal pain, anal bleeding, blood in stool, constipation, diarrhea, nausea and vomiting. Endocrine: Negative. Musculoskeletal: Negative for joint swelling, myalgias and neck pain. Skin: Negative. Neurological: Negative for dizziness and headaches. Psychiatric/Behavioral: Negative for sleep disturbance. All other systems reviewed and are negative.          Objective:       Physical Exam:  /80 (Site: Left Upper Arm, Position: Sitting, Cuff Size: Medium Adult)   Pulse 65   Temp 98.2 °F (36.8 °C) (Temporal)   Wt 151 lb (68.5 kg)   SpO2 99%   BMI 24.37 kg/m² Physical Exam  Vitals signs and nursing note reviewed. Constitutional:       General: He is not in acute distress. Appearance: He is well-developed. He is not ill-appearing. Eyes:      General: Lids are normal. Vision grossly intact. Cardiovascular:      Rate and Rhythm: Normal rate and regular rhythm. Heart sounds: Normal heart sounds, S1 normal and S2 normal. No murmur. No friction rub. No gallop. Pulmonary:      Effort: Pulmonary effort is normal. No respiratory distress. Breath sounds: Normal breath sounds. No wheezing. Abdominal:      General: Bowel sounds are normal.      Palpations: Abdomen is soft. There is no mass. Tenderness: There is no abdominal tenderness. There is no guarding. Musculoskeletal: Normal range of motion. Skin:     General: Skin is warm and dry. Capillary Refill: Capillary refill takes less than 2 seconds. Neurological:      General: No focal deficit present. Mental Status: He is alert and oriented to person, place, and time.          Data Review  Hospital Outpatient Visit on 09/10/2020   Component Date Value    TSH 09/10/2020 1.49     WBC 09/10/2020 8.8     RBC 09/10/2020 4.80     Hemoglobin 09/10/2020 14.3     Hematocrit 09/10/2020 43.8     MCV 09/10/2020 91.3     MCH 09/10/2020 29.8     MCHC 09/10/2020 32.6     RDW 09/10/2020 13.1     Platelets 16/11/9534 253     MPV 09/10/2020 9.3     NRBC Automated 09/10/2020 0.0     Differential Type 09/10/2020 NOT REPORTED     Seg Neutrophils 09/10/2020 66*    Lymphocytes 09/10/2020 25     Monocytes 09/10/2020 7     Eosinophils % 09/10/2020 1     Basophils 09/10/2020 0     Immature Granulocytes 09/10/2020 1*    Segs Absolute 09/10/2020 5.76     Absolute Lymph # 09/10/2020 2.22     Absolute Mono # 09/10/2020 0.63     Absolute Eos # 09/10/2020 0.08     Basophils Absolute 09/10/2020 <0.03     Absolute Immature Granul* 09/10/2020 0.04     WBC Morphology 09/10/2020 NOT REPORTED    

## 2021-03-16 DIAGNOSIS — N52.9 ERECTILE DYSFUNCTION, UNSPECIFIED ERECTILE DYSFUNCTION TYPE: ICD-10-CM

## 2021-03-16 NOTE — TELEPHONE ENCOUNTER
Last visit: 3/3/21  Last Med refill: 4/13/20  Does patient have enough medication for 72 hours: No:     Next Visit Date:  Future Appointments   Date Time Provider Guzman Aleman   9/8/2021  2:15 PM Funmilayo Aviles  [address] Maintenance   Topic Date Due    Hepatitis C screen  Never done    COVID-19 Vaccine (1) Never done    Shingles Vaccine (1 of 2) Never done    Flu vaccine (1) 03/03/2022 (Originally 9/1/2020)    Pneumococcal 0-64 years Vaccine (1 of 1 - PPSV23) 03/03/2024 (Originally 5/29/1971)    Potassium monitoring  09/10/2021    Creatinine monitoring  09/10/2021    DTaP/Tdap/Td vaccine (2 - Td) 02/14/2022    Colon cancer screen colonoscopy  09/25/2022    Lipid screen  09/10/2025    HIV screen  Addressed    Hepatitis A vaccine  Aged Out    Hepatitis B vaccine  Aged Out    Hib vaccine  Aged Out    Meningococcal (ACWY) vaccine  Aged Out       Hemoglobin A1C (%)   Date Value   07/17/2018 5.4   09/27/2014 5.8             ( goal A1C is < 7)   No results found for: LABMICR  LDL Cholesterol (mg/dL)   Date Value   09/10/2020 112   07/19/2019 119     LDL Calculated (mg/dL)   Date Value   07/17/2018 105   06/17/2017 124       (goal LDL is <100)   AST (U/L)   Date Value   09/10/2020 19     ALT (U/L)   Date Value   09/10/2020 18     BUN (mg/dL)   Date Value   09/10/2020 15     BP Readings from Last 3 Encounters:   03/03/21 118/80   09/02/20 122/80   12/18/19 120/78          (goal 120/80)    All Future Testing planned in [facility]  Lab Frequency Next Occurrence               Patient Active Problem List:     Dyslipidemia (high LDL; low HDL)     Essential hypertension     Positive FIT (fecal immunochemical test)     Edema of colon     Terminal ileitis without complication (HCC)     Erectile dysfunction     Tobacco abuse     Rupture of right distal biceps tendon     Shift work sleep disorder

## 2021-03-18 RX ORDER — TADALAFIL 5 MG/1
TABLET ORAL
Qty: 30 TABLET | Refills: 1 | Status: SHIPPED | OUTPATIENT
Start: 2021-03-18 | End: 2021-12-16

## 2021-08-15 DIAGNOSIS — I10 ESSENTIAL HYPERTENSION: ICD-10-CM

## 2021-08-15 DIAGNOSIS — M62.838 MUSCLE SPASM: ICD-10-CM

## 2021-08-15 DIAGNOSIS — R52 GENERALIZED BODY ACHES: ICD-10-CM

## 2021-08-16 RX ORDER — CYCLOBENZAPRINE HCL 10 MG
TABLET ORAL
Qty: 30 TABLET | Refills: 2 | Status: SHIPPED | OUTPATIENT
Start: 2021-08-16 | End: 2022-03-08 | Stop reason: SDUPTHER

## 2021-08-16 RX ORDER — MELOXICAM 15 MG/1
TABLET ORAL
Qty: 90 TABLET | Refills: 1 | Status: SHIPPED | OUTPATIENT
Start: 2021-08-16 | End: 2022-03-08 | Stop reason: SDUPTHER

## 2021-08-16 RX ORDER — LISINOPRIL 20 MG/1
TABLET ORAL
Qty: 90 TABLET | Refills: 1 | Status: SHIPPED | OUTPATIENT
Start: 2021-08-16 | End: 2022-02-16

## 2021-09-08 ENCOUNTER — OFFICE VISIT (OUTPATIENT)
Dept: FAMILY MEDICINE CLINIC | Age: 56
End: 2021-09-08
Payer: COMMERCIAL

## 2021-09-08 VITALS
WEIGHT: 153 LBS | HEART RATE: 60 BPM | DIASTOLIC BLOOD PRESSURE: 78 MMHG | SYSTOLIC BLOOD PRESSURE: 130 MMHG | BODY MASS INDEX: 24.59 KG/M2 | RESPIRATION RATE: 20 BRPM | OXYGEN SATURATION: 98 % | HEIGHT: 66 IN

## 2021-09-08 DIAGNOSIS — Z72.0 TOBACCO ABUSE: ICD-10-CM

## 2021-09-08 DIAGNOSIS — Z87.891 PERSONAL HISTORY OF TOBACCO USE: ICD-10-CM

## 2021-09-08 DIAGNOSIS — Z13.0 SCREENING, ANEMIA, DEFICIENCY, IRON: ICD-10-CM

## 2021-09-08 DIAGNOSIS — Z11.59 NEED FOR HEPATITIS C SCREENING TEST: ICD-10-CM

## 2021-09-08 DIAGNOSIS — E78.5 DYSLIPIDEMIA (HIGH LDL; LOW HDL): ICD-10-CM

## 2021-09-08 DIAGNOSIS — I10 ESSENTIAL HYPERTENSION: Primary | ICD-10-CM

## 2021-09-08 DIAGNOSIS — Z12.5 ENCOUNTER FOR PROSTATE CANCER SCREENING: ICD-10-CM

## 2021-09-08 DIAGNOSIS — M62.838 MUSCLE SPASM: ICD-10-CM

## 2021-09-08 DIAGNOSIS — Z23 NEED FOR SHINGLES VACCINE: ICD-10-CM

## 2021-09-08 PROCEDURE — G8427 DOCREV CUR MEDS BY ELIG CLIN: HCPCS | Performed by: INTERNAL MEDICINE

## 2021-09-08 PROCEDURE — G8420 CALC BMI NORM PARAMETERS: HCPCS | Performed by: INTERNAL MEDICINE

## 2021-09-08 PROCEDURE — G0296 VISIT TO DETERM LDCT ELIG: HCPCS | Performed by: INTERNAL MEDICINE

## 2021-09-08 PROCEDURE — 99214 OFFICE O/P EST MOD 30 MIN: CPT | Performed by: INTERNAL MEDICINE

## 2021-09-08 PROCEDURE — 1036F TOBACCO NON-USER: CPT | Performed by: INTERNAL MEDICINE

## 2021-09-08 PROCEDURE — 3017F COLORECTAL CA SCREEN DOC REV: CPT | Performed by: INTERNAL MEDICINE

## 2021-09-08 RX ORDER — ZOSTER VACCINE RECOMBINANT, ADJUVANTED 50 MCG/0.5
0.5 KIT INTRAMUSCULAR SEE ADMIN INSTRUCTIONS
Qty: 0.5 ML | Refills: 0 | Status: SHIPPED | OUTPATIENT
Start: 2021-09-08 | End: 2022-03-07

## 2021-09-08 ASSESSMENT — ENCOUNTER SYMPTOMS
COUGH: 0
ANAL BLEEDING: 0
DIARRHEA: 0
CHEST TIGHTNESS: 0
NAUSEA: 0
CHOKING: 0
ABDOMINAL PAIN: 0
BLOOD IN STOOL: 0
SHORTNESS OF BREATH: 0
VOMITING: 0
WHEEZING: 0
CONSTIPATION: 0

## 2021-09-08 ASSESSMENT — VISUAL ACUITY: OU: 1

## 2021-09-08 NOTE — PATIENT INSTRUCTIONS
false in the sense that no lung cancer is ultimately found. Additionally, some (about 10%) of the cancers found would not affect your life expectancy, even if undetected and untreated. If you are still smoking, the single most important thing that you can do to reduce your risk of dying of lung cancer is to quit. For this screening to be covered by Medicare and most other insurers, strict criteria must be met. If you do not meet these criteria, but still wish to undergo LDCT testing, you will be required to sign a waiver indicating your willingness to pay for the scan.

## 2021-09-08 NOTE — PROGRESS NOTES
Subjective:       Patient ID:     Clemmie Lundborg. is a 49 y.o. male who presents for   Chief Complaint   Patient presents with    Hypertension     6 month follow up   [facility] Maintenance     refuses influenza, shingles. HPI:  Nursing note reviewed and discussed with patient. Hypertension-tolerating current regimen without chest pain, palpitations, dizziness, peripheral edema, dyspnea on exertion, orthopnea, paroxysmal nocturnal dyspnea. Hyperlipidemia- diet and lifestyle managed. Not on medication at this time   Mostly compliant with diet recommendations for low salt diet, tries to limit greasy/cheesy/fried foods, not very compliant with exercise recommendations. Switched from cigarettes to vaping, going through 3 ml a day but was able to drop nicotine levels. Does not have a quit date in mind. Cardiovascular risk factors: dyslipidemia, hypertension, male gender and sedentary lifestyle  cialis working well for his ED       Patient's medications, allergies, past medical, surgical, social and family histories were reviewed and updated as appropriate. Past Medical History:   Diagnosis Date    Hypertension      Past Surgical History:   Procedure Laterality Date    COLONOSCOPY  2017    erosions; edema and patchy acute inflammation    [place] COLONOSCOPY W/BIOPSY SINGLE/MULTIPLE N/A 2017    COLONOSCOPY WITH BIOPSY performed by Ned Nuno MD at [address] [place] [address] History     Tobacco Use    Smoking status: Current Every Day Smoker     Packs/day: 1.00     Years: 28.00     Pack years: 28.00     Types: Cigarettes     Last attempt to quit: 2016     Years since quittin.8    Smokeless tobacco: Never Used   Substance Use Topics    Alcohol use:  Yes     Alcohol/week: 10.0 standard drinks     Types: 12 Standard drinks or equivalent per week     Comment: rare      Patient Active Problem List   Diagnosis    Dyslipidemia (high LDL; low HDL)    Essential hypertension    Positive FIT (fecal immunochemical test)    Edema of colon    Terminal ileitis without complication (HCC)    Erectile dysfunction    Tobacco abuse    Rupture of right distal biceps tendon    Shift work sleep disorder         Prior to Visit Medications    Medication Sig Taking? Authorizing Provider   cyclobenzaprine (FLEXERIL) 10 MG tablet take 1 tablet by mouth nightly if needed for muscle spasm Yes Funmilayo Pimentel MD   lisinopril (PRINIVIL;ZESTRIL) 20 MG tablet take 1 tablet by mouth once daily Yes Funmilayo Pimentel MD   meloxicam (MOBIC) 15 MG tablet take 1 tablet by mouth once daily Yes Funmilayo Pimentel MD   tadalafil (CIALIS) 5 MG tablet take 1 tablet by mouth if needed for ERECTILE DYSFUNCTION Yes Funmilayo Pimentel MD   RA ALLERGY 25 MG tablet take 2 tablets by mouth every evening if needed for sleep Yes Funmilayo Pimentel MD   Melatonin ER 10 MG TBCR Take 10 mg by mouth every evening Yes Hazel Lockwood MD     Review of Systems  Review of Systems   Constitutional: Negative for fatigue, fever and unexpected weight change. Respiratory: Negative for cough, choking, chest tightness, shortness of breath and wheezing. Cardiovascular: Negative for chest pain, palpitations and leg swelling. Gastrointestinal: Negative for abdominal pain, anal bleeding, blood in stool, constipation, diarrhea, nausea and vomiting. Endocrine: Negative. Musculoskeletal: Negative for joint swelling and myalgias. Skin: Negative. Neurological: Negative for dizziness. Psychiatric/Behavioral: Negative for sleep disturbance. All other systems reviewed and are negative. Objective:       Physical Exam:  /78   Pulse 60   Resp 20   Ht 5' 6\" (1.676 m)   Wt 153 lb (69.4 kg)   SpO2 98%   BMI 24.69 kg/m²   Physical Exam  Vitals and nursing note reviewed. Constitutional:       General: He is not in acute distress. Appearance: He is well-developed. He is not ill-appearing.    Eyes:      General: Lids are normal. Vision grossly intact. Cardiovascular:      Rate and Rhythm: Normal rate and regular rhythm. Heart sounds: Normal heart sounds, S1 normal and S2 normal. No murmur heard. No friction rub. No gallop. Pulmonary:      Effort: Pulmonary effort is normal. No respiratory distress. Breath sounds: Normal breath sounds. No wheezing. Abdominal:      General: Bowel sounds are normal.      Palpations: Abdomen is soft. There is no mass. Tenderness: There is no abdominal tenderness. There is no guarding. Musculoskeletal:         General: Normal range of motion. Skin:     General: Skin is warm and dry. Capillary Refill: Capillary refill takes less than 2 seconds. Neurological:      General: No focal deficit present. Mental Status: He is alert and oriented to person, place, and time. Data Review  not applicable       Assessment/Plan:      1. Essential hypertension  Continue lisinopril   - Comprehensive Metabolic Panel; Future    2. Muscle spasm  Continue flexeril    3. Dyslipidemia (high LDL; low HDL)  Continue diet and lifestyle management   - Lipid Panel; Future  - Comprehensive Metabolic Panel; Future    4. Tobacco abuse  encouraged to wean off vaping    5. Screening, anemia, deficiency, iron  - CBC Auto Differential; Future    6. Need for hepatitis C screening test  - Hepatitis C Antibody; Future    7. Encounter for prostate cancer screening  - PSA Screening; Future    8. Need for shingles vaccine  - zoster recombinant adjuvanted vaccine [facility]) 50 MCG/0.5ML SUSR injection; Inject 0.5 mLs into the muscle See Admin Instructions 1 dose now and repeat in 2-6 months  Dispense: 0.5 mL; Refill: 0    9. Personal history of tobacco use  - VT VISIT TO DISCUSS LUNG CA SCREEN W LDCT  - CT Lung Screen (Annual);  Future           Health Maintenance Due   Topic Date Due    Hepatitis C screen  Never done    Shingles Vaccine (1 of 2) Never done    Low dose CT lung screening  Never done   [facility] Flu vaccine (1) Never done    Potassium monitoring  09/10/2021    Creatinine monitoring  09/10/2021       Electronically signed by Chiquita Martinez MD on 9/8/2021 at 2:50 PM  Low Dose CT (LDCT) Lung Screening criteria met   Age 50-69   Pack year smoking >30   Still smoking or less than 15 year since quit   No sign or symptoms of lung cancer   > 11 months since last LDCT     Risks and benefits of lung cancer screening with LDCT scans discussed:    Significance of positive screen - False-positive LDCT results often occur. 95% of all positive results do not lead to a diagnosis of cancer. Usually further imaging can resolve most false-positive results; however, some patients may require invasive procedures. Over diagnosis risk - 10% to 12% of screen-detected lung cancer cases are over diagnosed--that is, the cancer would not have been detected in the patient's lifetime without the screening. Need for follow up screens annually to continue lung cancer screening effectiveness     Risks associated with radiation from annual LDCT- Radiation exposure is about the same as for a mammogram, which is about 1/3 of the annual background radiation exposure from everyday life. Starting screening at age 54 is not likely to increase cancer risk from radiation exposure. Patients with comorbidities resulting in life expectancy of < 10 years, or that would preclude treatment of an abnormality identified on CT, should not be screened due to lack of benefit.     To obtain maximal benefit from this screening, smoking cessation and long-term abstinence from smoking is critical 101.6

## 2021-09-13 ENCOUNTER — HOSPITAL ENCOUNTER (OUTPATIENT)
Age: 56
Setting detail: SPECIMEN
Discharge: HOME | End: 2021-09-13
Payer: COMMERCIAL

## 2021-09-13 DIAGNOSIS — I10 ESSENTIAL HYPERTENSION: ICD-10-CM

## 2021-09-13 DIAGNOSIS — Z12.5 ENCOUNTER FOR PROSTATE CANCER SCREENING: ICD-10-CM

## 2021-09-13 DIAGNOSIS — Z13.0 SCREENING, ANEMIA, DEFICIENCY, IRON: ICD-10-CM

## 2021-09-13 DIAGNOSIS — E78.5 DYSLIPIDEMIA (HIGH LDL; LOW HDL): ICD-10-CM

## 2021-09-13 DIAGNOSIS — Z11.59 NEED FOR HEPATITIS C SCREENING TEST: ICD-10-CM

## 2021-09-13 LAB
ABSOLUTE EOS #: 0.14 K/UL (ref 0–0.44)
ABSOLUTE IMMATURE GRANULOCYTE: 0.03 K/UL (ref 0–0.3)
ABSOLUTE LYMPH #: 1.99 K/UL (ref 1.1–3.7)
ABSOLUTE MONO #: 0.58 K/UL (ref 0.1–1.2)
ALBUMIN SERPL-MCNC: 4.4 G/DL (ref 3.5–5.2)
ALBUMIN/GLOBULIN RATIO: 1.5 (ref 1–2.5)
ALP BLD-CCNC: 86 U/L (ref 40–129)
ALT SERPL-CCNC: 18 U/L (ref 5–41)
ANION GAP SERPL CALCULATED.3IONS-SCNC: 14 MMOL/L (ref 9–17)
AST SERPL-CCNC: 16 U/L
BASOPHILS # BLD: 0 % (ref 0–2)
BASOPHILS ABSOLUTE: 0.04 K/UL (ref 0–0.2)
BILIRUB SERPL-MCNC: 0.62 MG/DL (ref 0.3–1.2)
BUN BLDV-MCNC: 22 MG/DL (ref 6–20)
BUN/CREAT BLD: ABNORMAL (ref 9–20)
CALCIUM SERPL-MCNC: 9.3 MG/DL (ref 8.6–10.4)
CHLORIDE BLD-SCNC: 104 MMOL/L (ref 98–107)
CHOLESTEROL/HDL RATIO: 4.4
CHOLESTEROL: 202 MG/DL
CO2: 22 MMOL/L (ref 20–31)
CREAT SERPL-MCNC: 0.92 MG/DL (ref 0.7–1.2)
DIFFERENTIAL TYPE: ABNORMAL
EOSINOPHILS RELATIVE PERCENT: 2 % (ref 1–4)
GFR AFRICAN AMERICAN: >60 ML/MIN
GFR NON-AFRICAN AMERICAN: >60 ML/MIN
GFR SERPL CREATININE-BSD FRML MDRD: ABNORMAL ML/MIN/{1.73_M2}
GFR SERPL CREATININE-BSD FRML MDRD: ABNORMAL ML/MIN/{1.73_M2}
GLUCOSE BLD-MCNC: 88 MG/DL (ref 70–99)
HCT VFR BLD CALC: 46.5 % (ref 40.7–50.3)
HDLC SERPL-MCNC: 46 MG/DL
HEMOGLOBIN: 14.7 G/DL (ref 13–17)
HEPATITIS C ANTIBODY: NONREACTIVE
IMMATURE GRANULOCYTES: 0 %
LDL CHOLESTEROL: 130 MG/DL (ref 0–130)
LYMPHOCYTES # BLD: 22 % (ref 24–43)
MCH RBC QN AUTO: 29.1 PG (ref 25.2–33.5)
MCHC RBC AUTO-ENTMCNC: 31.6 G/DL (ref 28.4–34.8)
MCV RBC AUTO: 91.9 FL (ref 82.6–102.9)
MONOCYTES # BLD: 7 % (ref 3–12)
NRBC AUTOMATED: 0 PER 100 WBC
PDW BLD-RTO: 13.1 % (ref 11.8–14.4)
PLATELET # BLD: 275 K/UL (ref 138–453)
PLATELET ESTIMATE: ABNORMAL
PMV BLD AUTO: 9.5 FL (ref 8.1–13.5)
POTASSIUM SERPL-SCNC: 4.6 MMOL/L (ref 3.7–5.3)
PROSTATE SPECIFIC ANTIGEN: 0.73 UG/L
RBC # BLD: 5.06 M/UL (ref 4.21–5.77)
RBC # BLD: ABNORMAL 10*6/UL
SEG NEUTROPHILS: 69 % (ref 36–65)
SEGMENTED NEUTROPHILS ABSOLUTE COUNT: 6.16 K/UL (ref 1.5–8.1)
SODIUM BLD-SCNC: 140 MMOL/L (ref 135–144)
TOTAL PROTEIN: 7.4 G/DL (ref 6.4–8.3)
TRIGL SERPL-MCNC: 131 MG/DL
VLDLC SERPL CALC-MCNC: ABNORMAL MG/DL (ref 1–30)
WBC # BLD: 8.9 K/UL (ref 3.5–11.3)
WBC # BLD: ABNORMAL 10*3/UL

## 2021-09-17 ENCOUNTER — TELEPHONE (OUTPATIENT)
Dept: ONCOLOGY | Age: 56
End: 2021-09-17

## 2021-09-17 NOTE — LETTER
9/17/2021        Matilda [address] [place] [id number]    Dear Adrian Bhandari.:    Your healthcare provider has ordered a low dose CT scan of the chest for lung cancer screening. You will find enclosed, information about CT lung screening. Please review the statement of understanding, you will be asked to sign a copy of this at the time of your CT scan    If you have not already been contacted to make the appointment for your scan, please call our scheduling department at [phone]    Keep in mind that CT lung screening does not take the place of smoking cessation. If you are a current smoker, you will find enclosed smoking cessation resources. Please do not hesitate to contact me if you have any questions or concerns.     [phone] [facility] [place],      [facility]  [phone]

## 2021-09-22 ENCOUNTER — TELEPHONE (OUTPATIENT)
Dept: FAMILY MEDICINE CLINIC | Age: 56
End: 2021-09-22

## 2021-09-22 NOTE — TELEPHONE ENCOUNTER
Patient does not meet criteria for CT lung screen, did you want to do diagnostic with a diagnosis or cancel order

## 2021-09-24 ENCOUNTER — HOSPITAL ENCOUNTER (OUTPATIENT)
Dept: CT IMAGING | Age: 56
Discharge: HOME | End: 2021-09-26

## 2021-09-24 DIAGNOSIS — Z87.891 PERSONAL HISTORY OF TOBACCO USE: ICD-10-CM

## 2021-09-24 PROCEDURE — 71271 CT THORAX LUNG CANCER SCR C-: CPT

## 2021-12-15 DIAGNOSIS — G47.9 SLEEPING DIFFICULTY: ICD-10-CM

## 2021-12-15 DIAGNOSIS — N52.9 ERECTILE DYSFUNCTION, UNSPECIFIED ERECTILE DYSFUNCTION TYPE: ICD-10-CM

## 2021-12-16 RX ORDER — PHENYLEPHRINE HCL 1 %
SPRAY, NON-AEROSOL (ML) NASAL
Qty: 60 TABLET | Refills: 5 | Status: SHIPPED | OUTPATIENT
Start: 2021-12-16

## 2021-12-16 RX ORDER — TADALAFIL 5 MG/1
TABLET ORAL
Qty: 30 TABLET | Refills: 1 | Status: SHIPPED | OUTPATIENT
Start: 2021-12-16 | End: 2022-09-08 | Stop reason: SDUPTHER

## 2021-12-16 NOTE — TELEPHONE ENCOUNTER
Last visit: 9/8/21  Last Med refill: 3/18/21, 2/17/21  Does patient have enough medication for 72 hours: Yes    Next Visit Date:  Future Appointments   Date Time Provider Guzman Aleman   3/8/2022  4:00 PM Funmilayo Kaye  [address] Maintenance   Topic Date Due    COVID-19 Vaccine (3 - Booster for Moderna series) 10/17/2021    Shingles Vaccine (2 of 2) 11/13/2021    Flu vaccine (1) 09/08/2022 (Originally 9/1/2021)    DTaP/Tdap/Td vaccine (2 - Td or Tdap) 02/14/2022    Potassium monitoring  09/13/2022    Creatinine monitoring  09/13/2022    Low dose CT lung screening  09/24/2022    Colon cancer screen colonoscopy  09/25/2022    Lipid screen  09/13/2026    Hepatitis C screen  Completed    HIV screen  Addressed    Hepatitis A vaccine  Aged Out    Hepatitis B vaccine  Aged Out    Hib vaccine  Aged Out    Meningococcal (ACWY) vaccine  Aged Out    Pneumococcal 0-64 years Vaccine  Aged Out       Hemoglobin A1C (%)   Date Value   07/17/2018 5.4   09/27/2014 5.8             ( goal A1C is < 7)   No results found for: LABMICR  LDL Cholesterol (mg/dL)   Date Value   09/13/2021 130   09/10/2020 112     LDL Calculated (mg/dL)   Date Value   07/17/2018 105   06/17/2017 124       (goal LDL is <100)   AST (U/L)   Date Value   09/13/2021 16     ALT (U/L)   Date Value   09/13/2021 18     BUN (mg/dL)   Date Value   09/13/2021 22 (H)     BP Readings from Last 3 Encounters:   09/08/21 130/78   03/03/21 118/80   09/02/20 122/80          (goal 120/80)    All Future Testing planned in [facility]  Lab Frequency Next Occurrence               Patient Active Problem List:     Dyslipidemia (high LDL; low HDL)     Essential hypertension     Positive FIT (fecal immunochemical test)     Edema of colon     Terminal ileitis without complication (HCC)     Erectile dysfunction     Tobacco abuse     Rupture of right distal biceps tendon     Shift work sleep disorder

## 2022-02-16 DIAGNOSIS — I10 ESSENTIAL HYPERTENSION: ICD-10-CM

## 2022-02-16 RX ORDER — LISINOPRIL 20 MG/1
TABLET ORAL
Qty: 90 TABLET | Refills: 1 | Status: SHIPPED | OUTPATIENT
Start: 2022-02-16 | End: 2022-08-16

## 2022-02-16 NOTE — TELEPHONE ENCOUNTER
Last visit: 9/8/21  Last Med refill: 8/16/21  Does patient have enough medication for 72 hours: No:     Next Visit Date:  Future Appointments   Date Time Provider Guzman Aleman   3/8/2022  4:00 PM Funmilayo Fields  [address] Maintenance   Topic Date Due    COVID-19 Vaccine (3 - Booster for Moderna series) 09/17/2021    Shingles Vaccine (2 of 2) 11/13/2021    DTaP/Tdap/Td vaccine (2 - Td or Tdap) 02/14/2022    Flu vaccine (1) 09/08/2022 (Originally 9/1/2021)    Depression Screen  03/03/2022    Potassium monitoring  09/13/2022    Creatinine monitoring  09/13/2022    Low dose CT lung screening  09/24/2022    Colorectal Cancer Screen  09/25/2022    Lipid screen  09/13/2026    Hepatitis C screen  Completed    HIV screen  Addressed    Hepatitis A vaccine  Aged Out    Hepatitis B vaccine  Aged Out    Hib vaccine  Aged Out    Meningococcal (ACWY) vaccine  Aged Out    Pneumococcal 0-64 years Vaccine  Aged Out       Hemoglobin A1C (%)   Date Value   07/17/2018 5.4   09/27/2014 5.8             ( goal A1C is < 7)   No results found for: LABMICR  LDL Cholesterol (mg/dL)   Date Value   09/13/2021 130   09/10/2020 112     LDL Calculated (mg/dL)   Date Value   07/17/2018 105   06/17/2017 124       (goal LDL is <100)   AST (U/L)   Date Value   09/13/2021 16     ALT (U/L)   Date Value   09/13/2021 18     BUN (mg/dL)   Date Value   09/13/2021 22 (H)     BP Readings from Last 3 Encounters:   09/08/21 130/78   03/03/21 118/80   09/02/20 122/80          (goal 120/80)    All Future Testing planned in [facility]  Lab Frequency Next Occurrence               Patient Active Problem List:     Dyslipidemia (high LDL; low HDL)     Essential hypertension     Positive FIT (fecal immunochemical test)     Edema of colon     Terminal ileitis without complication (HCC)     Erectile dysfunction     Tobacco abuse     Rupture of right distal biceps tendon     Shift work sleep disorder

## 2022-03-08 ENCOUNTER — OFFICE VISIT (OUTPATIENT)
Dept: FAMILY MEDICINE CLINIC | Age: 57
End: 2022-03-08
Payer: COMMERCIAL

## 2022-03-08 VITALS
OXYGEN SATURATION: 98 % | BODY MASS INDEX: 24.76 KG/M2 | TEMPERATURE: 99.1 F | HEART RATE: 64 BPM | WEIGHT: 153.4 LBS | DIASTOLIC BLOOD PRESSURE: 70 MMHG | SYSTOLIC BLOOD PRESSURE: 102 MMHG

## 2022-03-08 DIAGNOSIS — E78.5 DYSLIPIDEMIA (HIGH LDL; LOW HDL): ICD-10-CM

## 2022-03-08 DIAGNOSIS — I10 ESSENTIAL HYPERTENSION: Primary | ICD-10-CM

## 2022-03-08 DIAGNOSIS — K50.00 TERMINAL ILEITIS WITHOUT COMPLICATION (HCC): ICD-10-CM

## 2022-03-08 DIAGNOSIS — Z72.0 TOBACCO ABUSE: ICD-10-CM

## 2022-03-08 DIAGNOSIS — H00.011 HORDEOLUM EXTERNUM OF RIGHT UPPER EYELID: ICD-10-CM

## 2022-03-08 DIAGNOSIS — R52 GENERALIZED BODY ACHES: ICD-10-CM

## 2022-03-08 DIAGNOSIS — M62.838 MUSCLE SPASM: ICD-10-CM

## 2022-03-08 DIAGNOSIS — Z72.89 ENGAGES IN VAPING: ICD-10-CM

## 2022-03-08 PROCEDURE — G8427 DOCREV CUR MEDS BY ELIG CLIN: HCPCS | Performed by: INTERNAL MEDICINE

## 2022-03-08 PROCEDURE — 3017F COLORECTAL CA SCREEN DOC REV: CPT | Performed by: INTERNAL MEDICINE

## 2022-03-08 PROCEDURE — 99214 OFFICE O/P EST MOD 30 MIN: CPT | Performed by: INTERNAL MEDICINE

## 2022-03-08 PROCEDURE — G8484 FLU IMMUNIZE NO ADMIN: HCPCS | Performed by: INTERNAL MEDICINE

## 2022-03-08 PROCEDURE — G8420 CALC BMI NORM PARAMETERS: HCPCS | Performed by: INTERNAL MEDICINE

## 2022-03-08 PROCEDURE — 1036F TOBACCO NON-USER: CPT | Performed by: INTERNAL MEDICINE

## 2022-03-08 RX ORDER — POLYMYXIN B SULFATE AND TRIMETHOPRIM 1; 10000 MG/ML; [USP'U]/ML
1 SOLUTION OPHTHALMIC EVERY 6 HOURS
Qty: 10 ML | Refills: 0 | Status: SHIPPED | OUTPATIENT
Start: 2022-03-08 | End: 2022-03-18

## 2022-03-08 RX ORDER — CYCLOBENZAPRINE HCL 10 MG
10 TABLET ORAL 3 TIMES DAILY PRN
Qty: 30 TABLET | Refills: 2 | Status: SHIPPED | OUTPATIENT
Start: 2022-03-08

## 2022-03-08 RX ORDER — MELOXICAM 15 MG/1
TABLET ORAL
Qty: 90 TABLET | Refills: 1 | Status: SHIPPED | OUTPATIENT
Start: 2022-03-08 | End: 2022-09-08 | Stop reason: SDUPTHER

## 2022-03-08 SDOH — ECONOMIC STABILITY: FOOD INSECURITY: WITHIN THE PAST 12 MONTHS, YOU WORRIED THAT YOUR FOOD WOULD RUN OUT BEFORE YOU GOT MONEY TO BUY MORE.: NEVER TRUE

## 2022-03-08 SDOH — ECONOMIC STABILITY: FOOD INSECURITY: WITHIN THE PAST 12 MONTHS, THE FOOD YOU BOUGHT JUST DIDN'T LAST AND YOU DIDN'T HAVE MONEY TO GET MORE.: NEVER TRUE

## 2022-03-08 ASSESSMENT — PATIENT HEALTH QUESTIONNAIRE - PHQ9
7. TROUBLE CONCENTRATING ON THINGS, SUCH AS READING THE NEWSPAPER OR WATCHING TELEVISION: 0
5. POOR APPETITE OR OVEREATING: 0
1. LITTLE INTEREST OR PLEASURE IN DOING THINGS: 0
2. FEELING DOWN, DEPRESSED OR HOPELESS: 0
6. FEELING BAD ABOUT YOURSELF - OR THAT YOU ARE A FAILURE OR HAVE LET YOURSELF OR YOUR FAMILY DOWN: 0
3. TROUBLE FALLING OR STAYING ASLEEP: 2
9. THOUGHTS THAT YOU WOULD BE BETTER OFF DEAD, OR OF HURTING YOURSELF: 0
SUM OF ALL RESPONSES TO PHQ QUESTIONS 1-9: 3
SUM OF ALL RESPONSES TO PHQ QUESTIONS 1-9: 3
4. FEELING TIRED OR HAVING LITTLE ENERGY: 1
SUM OF ALL RESPONSES TO PHQ9 QUESTIONS 1 & 2: 0
SUM OF ALL RESPONSES TO PHQ QUESTIONS 1-9: 3
SUM OF ALL RESPONSES TO PHQ QUESTIONS 1-9: 3
10. IF YOU CHECKED OFF ANY PROBLEMS, HOW DIFFICULT HAVE THESE PROBLEMS MADE IT FOR YOU TO DO YOUR WORK, TAKE CARE OF THINGS AT HOME, OR GET ALONG WITH OTHER PEOPLE: 0
8. MOVING OR SPEAKING SO SLOWLY THAT OTHER PEOPLE COULD HAVE NOTICED. OR THE OPPOSITE, BEING SO FIGETY OR RESTLESS THAT YOU HAVE BEEN MOVING AROUND A LOT MORE THAN USUAL: 0

## 2022-03-08 ASSESSMENT — ENCOUNTER SYMPTOMS
BLOOD IN STOOL: 0
CONSTIPATION: 0
CHOKING: 0
NAUSEA: 0
WHEEZING: 0
ABDOMINAL PAIN: 0
ANAL BLEEDING: 0
DIARRHEA: 0
VOMITING: 0
SHORTNESS OF BREATH: 0
COUGH: 0
CHEST TIGHTNESS: 0

## 2022-03-08 ASSESSMENT — SOCIAL DETERMINANTS OF HEALTH (SDOH): HOW HARD IS IT FOR YOU TO PAY FOR THE VERY BASICS LIKE FOOD, HOUSING, MEDICAL CARE, AND HEATING?: NOT VERY HARD

## 2022-03-08 ASSESSMENT — VISUAL ACUITY: OU: 1

## 2022-03-08 NOTE — PROGRESS NOTES
Subjective:       Patient ID:     Becky Cordova is a 49 y.o. male who presents for   Chief Complaint   Patient presents with    Hypertension       HPI:  Nursing note reviewed and discussed with patient. Hypertension-tolerating current regimen without chest pain, palpitations, dizziness, peripheral edema, dyspnea on exertion, orthopnea, paroxysmal nocturnal dyspnea. Hyperlipidemia- hasnt been very successful with diet and lifestyle changes over the holidays, thinks will be able to do it better over the summer. cialis working well for him, no issues   Meloxicam working well for his aches and pains   Mostly compliant with diet recommendations for low salt diet, tries to limit greasy/cheesy/fried foods, not very compliant with exercise recommendations. Cardiovascular risk factors: dyslipidemia, hypertension, male gender, sedentary lifestyle and vaping    Stye on R eye since christmas, not getting better, would like it looked at     Patient's medications, allergies, past medical, surgical, social and family histories were reviewed and updated as appropriate. Past Medical History:   Diagnosis Date    Hypertension      Past Surgical History:   Procedure Laterality Date    COLONOSCOPY  09/25/2017    erosions; edema and patchy acute inflammation    [place] COLONOSCOPY W/BIOPSY SINGLE/MULTIPLE N/A 9/25/2017    COLONOSCOPY WITH BIOPSY performed by Justo Drummond MD at [facility] History     Tobacco Use    Smoking status: Former Smoker     Packs/day: 1.00     Years: 28.00     Pack years: 28.00     Types: Cigarettes     Quit date: 2019     Years since quitting: 3.1    Smokeless tobacco: Never Used   Substance Use Topics    Alcohol use:  Yes     Alcohol/week: 10.0 standard drinks     Types: 12 Standard drinks or equivalent per week     Comment: rare      Patient Active Problem List   Diagnosis    Dyslipidemia (high LDL; low HDL)    Essential hypertension    Positive FIT (fecal immunochemical test)    Edema of colon    Terminal ileitis without complication ([facility] [address]    Erectile dysfunction    Tobacco abuse    Rupture of right distal biceps tendon    Shift work sleep disorder         Prior to Visit Medications    Medication Sig Taking? Authorizing Provider   lisinopril (PRINIVIL;ZESTRIL) 20 MG tablet take 1 tablet by mouth once daily Yes Master Guaman MD   RA ALLERGY 25 MG tablet take 2 tablets by mouth every evening if needed for sleep Yes Funmilayo Santiago MD   tadalafil (CIALIS) 5 MG tablet take 1 tablet by mouth if needed for ERECTILE DYSFUNCTION Yes Funmilayo Santiago MD   cyclobenzaprine (FLEXERIL) 10 MG tablet take 1 tablet by mouth nightly if needed for muscle spasm Yes Funmilayo Santiago MD   meloxicam (MOBIC) 15 MG tablet take 1 tablet by mouth once daily Yes Master Guaman MD   Melatonin ER 10 MG TBCR Take 10 mg by mouth every evening Yes Master Guaman MD     Review of Systems  Review of Systems   Constitutional: Negative for fatigue, fever and unexpected weight change. Respiratory: Negative for cough, choking, chest tightness, shortness of breath and wheezing. Cardiovascular: Negative for chest pain, palpitations and leg swelling. Gastrointestinal: Negative for abdominal pain, anal bleeding, blood in stool, constipation, diarrhea, nausea and vomiting. Endocrine: Negative. Musculoskeletal: Negative for joint swelling and myalgias. Skin: Negative. Neurological: Negative for dizziness. Psychiatric/Behavioral: Negative for sleep disturbance. All other systems reviewed and are negative. Objective:       Physical Exam:  /70   Pulse 64   Temp 99.1 °F (37.3 °C) (Temporal)   Wt 153 lb 6.4 oz (69.6 kg)   SpO2 98%   BMI 24.76 kg/m²   Physical Exam  Vitals and nursing note reviewed. Constitutional:       General: He is not in acute distress. Appearance: He is well-developed. He is not ill-appearing.    Eyes:      General: Lids are normal. Vision grossly intact. Right eye: Hordeolum present. Cardiovascular:      Rate and Rhythm: Normal rate and regular rhythm. Heart sounds: Normal heart sounds, S1 normal and S2 normal. No murmur heard. No friction rub. No gallop. Pulmonary:      Effort: Pulmonary effort is normal. No respiratory distress. Breath sounds: Normal breath sounds. No wheezing. Abdominal:      General: Bowel sounds are normal.      Palpations: Abdomen is soft. There is no mass. Tenderness: There is no abdominal tenderness. There is no guarding. Musculoskeletal:         General: Normal range of motion. Skin:     General: Skin is warm and dry. Capillary Refill: Capillary refill takes less than 2 seconds. Neurological:      General: No focal deficit present. Mental Status: He is alert and oriented to person, place, and time. Data Review  not applicable  The 96-SRLE ASCVD risk score (Ashlie Disla, et al., 2013) is: 4.4%    Values used to calculate the score:      Age: 49 years      Sex: Male      Is Non- : No      Diabetic: No      Tobacco smoker: No      Systolic Blood Pressure: 468 mmHg      Is BP treated: Yes      HDL Cholesterol: 46 mg/dL      Total Cholesterol: 202 mg/dL       Assessment/Plan:      1. Essential hypertension  Continue current regimen     2. Generalized body aches  - meloxicam (MOBIC) 15 MG tablet; take 1 tablet by mouth once daily  Dispense: 90 tablet; Refill: 1    3. Muscle spasm  - cyclobenzaprine (FLEXERIL) 10 MG tablet; Take 1 tablet by mouth 3 times daily as needed for Muscle spasms  Dispense: 30 tablet; Refill: 2    4. Terminal ileitis without complication ([address]  Asymptomatic at this time     5. Dyslipidemia (high LDL; low HDL)  Repeat labs in six months     6. Tobacco abuse  vaping     7. Engages in vaping  >10 minutes counseling to cut back or quit if possible - increasing vape use        8.  Hordeolum externum of right upper eyelid  - trimethoprim-polymyxin b (POLYTRIM) 59869-0.1 UNIT/ML-% ophthalmic solution; Place 1 drop into the right eye every 6 hours for 10 days  Dispense: 10 mL; Refill: 0        Health Maintenance Due   Topic Date Due    COVID-19 Vaccine (3 - Booster for Moderna series) 09/17/2021    Shingles Vaccine (2 of 2) 11/13/2021    DTaP/Tdap/Td vaccine (2 - Td or Tdap) 02/14/2022       Electronically signed by Esther Webb MD on 3/8/2022 at 4:10 PM

## 2022-03-08 NOTE — PROGRESS NOTES
Pt is here for a 6 month follow up  Pt has a spot/stye on RT eye since [place]  He states no refills are needed.   Depression screening done

## 2022-03-16 ENCOUNTER — TELEPHONE (OUTPATIENT)
Dept: FAMILY MEDICINE CLINIC | Age: 57
End: 2022-03-16

## 2022-03-16 RX ORDER — CEPHALEXIN 500 MG/1
500 CAPSULE ORAL 3 TIMES DAILY
Qty: 21 CAPSULE | Refills: 0 | Status: SHIPPED | OUTPATIENT
Start: 2022-03-16 | End: 2022-03-23

## 2022-03-16 NOTE — TELEPHONE ENCOUNTER
Patient states he was told to call back in a week if his stye was not better. States the eye drops did not help. States it looks the same as when he was here on 3/8.

## 2022-08-15 DIAGNOSIS — I10 ESSENTIAL HYPERTENSION: ICD-10-CM

## 2022-08-15 NOTE — TELEPHONE ENCOUNTER
Last visit: 3/8/22  Last Med refill: 2/16/22  Does patient have enough medication for 72 hours: No:     Next Visit Date:  Future Appointments   Date Time Provider Guzman Love   9/8/2022  2:00 PM Funmilayo Guillaume  [address] Maintenance   Topic Date Due    DTaP/Tdap/Td vaccine (2 - Td or Tdap) 02/14/2022    COVID-19 Vaccine (4 - Booster for Moderna series) 04/11/2022    Flu vaccine (1) 09/01/2022    Low dose CT lung screening  09/24/2022    Colorectal Cancer Screen  09/25/2022    Depression Screen  03/08/2023    Lipids  09/13/2026    Shingles vaccine  Completed    Hepatitis C screen  Completed    HIV screen  Addressed    Hepatitis A vaccine  Aged Out    Hepatitis B vaccine  Aged Out    Hib vaccine  Aged Out    Meningococcal (ACWY) vaccine  Aged Out    Pneumococcal 0-64 years Vaccine  Aged Out       Hemoglobin A1C (%)   Date Value   07/17/2018 5.4   09/27/2014 5.8             ( goal A1C is < 7)   No results found for: LABMICR  LDL Cholesterol (mg/dL)   Date Value   09/13/2021 130   09/10/2020 112     LDL Calculated (mg/dL)   Date Value   07/17/2018 105   06/17/2017 124       (goal LDL is <100)   AST (U/L)   Date Value   09/13/2021 16     ALT (U/L)   Date Value   09/13/2021 18     BUN (mg/dL)   Date Value   09/13/2021 22 (H)     BP Readings from Last 3 Encounters:   03/08/22 102/70   09/08/21 130/78   03/03/21 118/80          (goal 120/80)    All Future Testing planned in [facility]  Lab Frequency Next Occurrence               Patient Active Problem List:     Dyslipidemia (high LDL; low HDL)     Essential hypertension     Positive FIT (fecal immunochemical test)     Edema of colon     Terminal ileitis without complication (HCC)     Erectile dysfunction     Tobacco abuse     Rupture of right distal biceps tendon     Shift work sleep disorder

## 2022-08-16 RX ORDER — LISINOPRIL 20 MG/1
TABLET ORAL
Qty: 90 TABLET | Refills: 1 | Status: SHIPPED | OUTPATIENT
Start: 2022-08-16

## 2022-08-25 ENCOUNTER — TELEPHONE (OUTPATIENT)
Dept: ONCOLOGY | Age: 57
End: 2022-08-25

## 2022-08-25 DIAGNOSIS — Z87.891 PERSONAL HISTORY OF NICOTINE DEPENDENCE: Primary | ICD-10-CM

## 2022-08-25 NOTE — TELEPHONE ENCOUNTER
Our records indicate that your patient is coming due for their annual lung cancer screening follow up testing. For your convenience, we have pended the order for the scan for you. If you do not agree with the need for the test, please cancel the order and let us know. Sincerely,    [address] [place] [address] Screening Program    Auto printed reminder letter sent to patient.

## 2022-09-08 ENCOUNTER — OFFICE VISIT (OUTPATIENT)
Dept: FAMILY MEDICINE CLINIC | Age: 57
End: 2022-09-08
Payer: COMMERCIAL

## 2022-09-08 VITALS
BODY MASS INDEX: 26.53 KG/M2 | DIASTOLIC BLOOD PRESSURE: 78 MMHG | OXYGEN SATURATION: 96 % | TEMPERATURE: 99.3 F | HEART RATE: 82 BPM | SYSTOLIC BLOOD PRESSURE: 118 MMHG | WEIGHT: 164.4 LBS

## 2022-09-08 DIAGNOSIS — Z72.0 TOBACCO ABUSE: ICD-10-CM

## 2022-09-08 DIAGNOSIS — K50.00 TERMINAL ILEITIS WITHOUT COMPLICATION (HCC): Primary | ICD-10-CM

## 2022-09-08 DIAGNOSIS — R52 GENERALIZED BODY ACHES: ICD-10-CM

## 2022-09-08 DIAGNOSIS — G47.26 SHIFT WORK SLEEP DISORDER: ICD-10-CM

## 2022-09-08 DIAGNOSIS — K63.89: ICD-10-CM

## 2022-09-08 DIAGNOSIS — I10 ESSENTIAL HYPERTENSION: ICD-10-CM

## 2022-09-08 DIAGNOSIS — E78.5 DYSLIPIDEMIA (HIGH LDL; LOW HDL): ICD-10-CM

## 2022-09-08 DIAGNOSIS — Z12.5 ENCOUNTER FOR PROSTATE CANCER SCREENING: ICD-10-CM

## 2022-09-08 DIAGNOSIS — N52.9 ERECTILE DYSFUNCTION, UNSPECIFIED ERECTILE DYSFUNCTION TYPE: ICD-10-CM

## 2022-09-08 DIAGNOSIS — Z23 NEED FOR PROPHYLACTIC VACCINATION WITH TETANUS TOXOID ALONE: ICD-10-CM

## 2022-09-08 PROCEDURE — 3017F COLORECTAL CA SCREEN DOC REV: CPT | Performed by: INTERNAL MEDICINE

## 2022-09-08 PROCEDURE — 99214 OFFICE O/P EST MOD 30 MIN: CPT | Performed by: INTERNAL MEDICINE

## 2022-09-08 PROCEDURE — 90471 IMMUNIZATION ADMIN: CPT | Performed by: INTERNAL MEDICINE

## 2022-09-08 PROCEDURE — 90715 TDAP VACCINE 7 YRS/> IM: CPT | Performed by: INTERNAL MEDICINE

## 2022-09-08 PROCEDURE — G8419 CALC BMI OUT NRM PARAM NOF/U: HCPCS | Performed by: INTERNAL MEDICINE

## 2022-09-08 PROCEDURE — G8427 DOCREV CUR MEDS BY ELIG CLIN: HCPCS | Performed by: INTERNAL MEDICINE

## 2022-09-08 PROCEDURE — 1036F TOBACCO NON-USER: CPT | Performed by: INTERNAL MEDICINE

## 2022-09-08 RX ORDER — MELOXICAM 15 MG/1
TABLET ORAL
Qty: 90 TABLET | Refills: 1 | Status: SHIPPED | OUTPATIENT
Start: 2022-09-08

## 2022-09-08 RX ORDER — TADALAFIL 5 MG/1
5 TABLET ORAL PRN
Qty: 30 TABLET | Refills: 1 | Status: SHIPPED | OUTPATIENT
Start: 2022-09-08

## 2022-09-08 NOTE — PATIENT INSTRUCTIONS
Your labs have been ordered today as fasting labs - this means you will need to fast overnight for at least 8 hours before you come in to get the blood drawn. You may have water, black tea or coffee without sugar or creamer prior to coming in for labs. Your lab results will be released to your [facility] account as they are resulted by the lab. I will send you a message regarding your results once I have had a chance to review them, usually within a couple of days, so if you have not heard from me it means I'm either waiting for some results, or that I have not had a moment to review the results.

## 2022-09-08 NOTE — PROGRESS NOTES
Past Medical History:   Diagnosis Date    Hypertension       Past Surgical History:   Procedure Laterality Date    COLONOSCOPY  09/25/2017    erosions; edema and patchy acute inflammation    [place] COLONOSCOPY W/BIOPSY SINGLE/MULTIPLE N/A 9/25/2017    COLONOSCOPY WITH BIOPSY performed by Sylwia Nelson MD at [address] [place] [address] OR       Family History   Problem Relation Age of Onset    Heart Disease Father        Social History     Tobacco Use    Smoking status: Former     Packs/day: 1.00     Years: 28.00     Pack years: 28.00     Types: Cigarettes     Quit date: 2019     Years since quitting: 3.6    Smokeless tobacco: Never   Substance Use Topics    Alcohol use: Yes     Alcohol/week: 10.0 standard drinks     Types: 12 Standard drinks or equivalent per week     Comment: rare        No Known Allergies  Prior to Visit Medications    Medication Sig Taking?  Authorizing Provider   lisinopril (PRINIVIL;ZESTRIL) 20 MG tablet take 1 tablet by mouth once daily Yes Funmilayo Rivas MD   cyclobenzaprine (FLEXERIL) 10 MG tablet Take 1 tablet by mouth 3 times daily as needed for Muscle spasms Yes Funmilayo Rivas MD   meloxicam (MOBIC) 15 MG tablet take 1 tablet by mouth once daily Yes Funmilayo Rivas MD   RA ALLERGY 25 MG tablet take 2 tablets by mouth every evening if needed for sleep Yes Funmilayo Rivas MD   tadalafil (CIALIS) 5 MG tablet take 1 tablet by mouth if needed for ERECTILE DYSFUNCTION Yes Funmilayo Rivas MD   Melatonin ER 10 MG TBCR Take 10 mg by mouth every evening Yes Funmilayo Rivas MD       Review of Systems     Review of Systems    Objective     /78 (Site: Left Upper Arm, Position: Sitting, Cuff Size: Medium Adult)   Pulse 82   Temp 99.3 °F (37.4 °C)   Wt 164 lb 6.4 oz (74.6 kg)   SpO2 96%   BMI 26.53 kg/m²   Physical Exam      Data Review       Health Maintenance Due   Topic Date Due    DTaP/Tdap/Td vaccine (2 - Td or Tdap) 02/14/2022    Colorectal Cancer Screen  09/25/2022           Patient given educational materials- see patient instructions. Discussed use, benefit, and side effects of prescribedmedications. All patient questions answered. Pt voiced understanding. Reviewedhealth maintenance. Instructed to continue current medications, diet and exercise. Patient agreed with treatment plan. Follow up as directed.      Electronically signedby Sascha Horta MD on 9/8/2022

## 2022-09-12 ENCOUNTER — HOSPITAL ENCOUNTER (OUTPATIENT)
Age: 57
Setting detail: SPECIMEN
Discharge: HOME | End: 2022-09-12

## 2022-09-12 DIAGNOSIS — E78.5 DYSLIPIDEMIA (HIGH LDL; LOW HDL): ICD-10-CM

## 2022-09-12 DIAGNOSIS — I10 ESSENTIAL HYPERTENSION: ICD-10-CM

## 2022-09-12 DIAGNOSIS — Z12.5 ENCOUNTER FOR PROSTATE CANCER SCREENING: ICD-10-CM

## 2022-09-12 LAB — PROSTATE SPECIFIC ANTIGEN: 0.97 NG/ML

## 2022-09-13 LAB
ALBUMIN SERPL-MCNC: 4.4 G/DL (ref 3.5–5.2)
ALBUMIN/GLOBULIN RATIO: 1.4 (ref 1–2.5)
ALP BLD-CCNC: 78 U/L (ref 40–129)
ALT SERPL-CCNC: 33 U/L (ref 5–41)
ANION GAP SERPL CALCULATED.3IONS-SCNC: 19 MMOL/L (ref 9–17)
AST SERPL-CCNC: 22 U/L
BILIRUB SERPL-MCNC: 0.4 MG/DL (ref 0.3–1.2)
BUN BLDV-MCNC: 17 MG/DL (ref 6–20)
CALCIUM SERPL-MCNC: 9.4 MG/DL (ref 8.6–10.4)
CHLORIDE BLD-SCNC: 106 MMOL/L (ref 98–107)
CHOLESTEROL, FASTING: 217 MG/DL
CHOLESTEROL/HDL RATIO: 4.7
CO2: 17 MMOL/L (ref 20–31)
CREAT SERPL-MCNC: 1.1 MG/DL (ref 0.7–1.2)
GFR AFRICAN AMERICAN: >60 ML/MIN
GFR NON-AFRICAN AMERICAN: >60 ML/MIN
GFR SERPL CREATININE-BSD FRML MDRD: ABNORMAL ML/MIN/{1.73_M2}
GLUCOSE BLD-MCNC: 84 MG/DL (ref 70–99)
HDLC SERPL-MCNC: 46 MG/DL
LDL CHOLESTEROL: 149 MG/DL (ref 0–130)
POTASSIUM SERPL-SCNC: 4.6 MMOL/L (ref 3.7–5.3)
SODIUM BLD-SCNC: 142 MMOL/L (ref 135–144)
TOTAL PROTEIN: 7.5 G/DL (ref 6.4–8.3)
TRIGLYCERIDE, FASTING: 111 MG/DL

## 2022-09-22 NOTE — RESULT ENCOUNTER NOTE
Notified via [employer] -     Your lab results were stable, we will repeat them next year or as needed. Please call the office with any questions.

## 2023-01-09 ENCOUNTER — TELEPHONE (OUTPATIENT)
Dept: GASTROENTEROLOGY | Age: 58
End: 2023-01-09

## 2023-01-09 DIAGNOSIS — K50.00 CROHN'S DISEASE OF SMALL INTESTINE WITHOUT COMPLICATION (HCC): ICD-10-CM

## 2023-01-09 DIAGNOSIS — K63.89 MALAKOPLAKIA OF ILEUM: Primary | ICD-10-CM

## 2023-01-09 NOTE — TELEPHONE ENCOUNTER
Patient called office to schedule colonoscopy. Per op notes 10 year recall from 9/25/17 colon. Seen in the office 10/9/17 and notes state 5 year recall. Scheduled for 4/24/23 STA 11 am.  Reviewed bowel prep instructions with patient over phone and mailed to home address. I did offer sooner however, the patient was only available on Mondays. On cancellation list as well.

## 2023-01-12 RX ORDER — POLYETHYLENE GLYCOL 3350 17 G/17G
238 POWDER, FOR SOLUTION ORAL ONCE
Qty: 238 G | Refills: 0 | Status: SHIPPED | OUTPATIENT
Start: 2023-01-12 | End: 2023-01-12

## 2023-01-12 RX ORDER — BISACODYL 5 MG
TABLET, DELAYED RELEASE (ENTERIC COATED) ORAL
Qty: 4 TABLET | Refills: 0 | Status: SHIPPED | OUTPATIENT
Start: 2023-01-12

## 2023-01-12 RX ORDER — BISACODYL 5 MG
5 TABLET, DELAYED RELEASE (ENTERIC COATED) ORAL DAILY PRN
Qty: 4 TABLET | Refills: 0 | Status: SHIPPED | OUTPATIENT
Start: 2023-01-12 | End: 2023-01-12 | Stop reason: SDUPTHER

## 2023-02-13 DIAGNOSIS — N52.9 ERECTILE DYSFUNCTION, UNSPECIFIED ERECTILE DYSFUNCTION TYPE: ICD-10-CM

## 2023-02-13 DIAGNOSIS — I10 ESSENTIAL HYPERTENSION: ICD-10-CM

## 2023-02-13 RX ORDER — LISINOPRIL 20 MG/1
TABLET ORAL
Qty: 90 TABLET | Refills: 1 | Status: SHIPPED | OUTPATIENT
Start: 2023-02-13

## 2023-02-13 RX ORDER — TADALAFIL 5 MG/1
TABLET ORAL
Qty: 30 TABLET | Refills: 1 | Status: SHIPPED | OUTPATIENT
Start: 2023-02-13

## 2023-02-13 NOTE — TELEPHONE ENCOUNTER
Kristina Nara. is calling to request a refill on the following medication(s):    Medication Request:  Requested Prescriptions     Pending Prescriptions Disp Refills    lisinopril (PRINIVIL;ZESTRIL) 20 MG tablet [Pharmacy Med Name: LISINOPRIL 20 MG TABLET] 90 tablet 1     Sig: take 1 tablet by mouth once daily    tadalafil (CIALIS) 5 MG tablet [Pharmacy Med Name: TADALAFIL 5 MG TABLET] 30 tablet 1     Sig: take 1 tablet by mouth once daily AS NEEDED FOR ERECTILE DYSFUNCTION       Last Visit Date (If Applicable):  6/4/8831    Next Visit Date:    3/8/2023

## 2023-03-08 ENCOUNTER — OFFICE VISIT (OUTPATIENT)
Dept: FAMILY MEDICINE CLINIC | Age: 58
End: 2023-03-08
Payer: COMMERCIAL

## 2023-03-08 VITALS
DIASTOLIC BLOOD PRESSURE: 80 MMHG | HEART RATE: 70 BPM | WEIGHT: 158.4 LBS | SYSTOLIC BLOOD PRESSURE: 130 MMHG | TEMPERATURE: 99.9 F | OXYGEN SATURATION: 98 % | BODY MASS INDEX: 25.57 KG/M2

## 2023-03-08 DIAGNOSIS — E78.5 DYSLIPIDEMIA (HIGH LDL; LOW HDL): ICD-10-CM

## 2023-03-08 DIAGNOSIS — N52.9 ERECTILE DYSFUNCTION, UNSPECIFIED ERECTILE DYSFUNCTION TYPE: ICD-10-CM

## 2023-03-08 DIAGNOSIS — I10 ESSENTIAL HYPERTENSION: Primary | ICD-10-CM

## 2023-03-08 DIAGNOSIS — K50.00 TERMINAL ILEITIS WITHOUT COMPLICATION (HCC): ICD-10-CM

## 2023-03-08 DIAGNOSIS — M62.838 MUSCLE SPASM: ICD-10-CM

## 2023-03-08 DIAGNOSIS — F17.290 VAPING NICOTINE DEPENDENCE, TOBACCO PRODUCT: ICD-10-CM

## 2023-03-08 DIAGNOSIS — G47.26 SHIFT WORK SLEEP DISORDER: ICD-10-CM

## 2023-03-08 PROCEDURE — G8427 DOCREV CUR MEDS BY ELIG CLIN: HCPCS | Performed by: INTERNAL MEDICINE

## 2023-03-08 PROCEDURE — 99214 OFFICE O/P EST MOD 30 MIN: CPT | Performed by: INTERNAL MEDICINE

## 2023-03-08 PROCEDURE — 3074F SYST BP LT 130 MM HG: CPT | Performed by: INTERNAL MEDICINE

## 2023-03-08 PROCEDURE — 3017F COLORECTAL CA SCREEN DOC REV: CPT | Performed by: INTERNAL MEDICINE

## 2023-03-08 PROCEDURE — G8419 CALC BMI OUT NRM PARAM NOF/U: HCPCS | Performed by: INTERNAL MEDICINE

## 2023-03-08 PROCEDURE — 1036F TOBACCO NON-USER: CPT | Performed by: INTERNAL MEDICINE

## 2023-03-08 PROCEDURE — G8484 FLU IMMUNIZE NO ADMIN: HCPCS | Performed by: INTERNAL MEDICINE

## 2023-03-08 PROCEDURE — 3078F DIAST BP <80 MM HG: CPT | Performed by: INTERNAL MEDICINE

## 2023-03-08 RX ORDER — CYCLOBENZAPRINE HCL 10 MG
10 TABLET ORAL 3 TIMES DAILY PRN
Qty: 30 TABLET | Refills: 2 | Status: SHIPPED | OUTPATIENT
Start: 2023-03-08

## 2023-03-08 SDOH — ECONOMIC STABILITY: INCOME INSECURITY: HOW HARD IS IT FOR YOU TO PAY FOR THE VERY BASICS LIKE FOOD, HOUSING, MEDICAL CARE, AND HEATING?: NOT VERY HARD

## 2023-03-08 SDOH — ECONOMIC STABILITY: FOOD INSECURITY: WITHIN THE PAST 12 MONTHS, THE FOOD YOU BOUGHT JUST DIDN'T LAST AND YOU DIDN'T HAVE MONEY TO GET MORE.: NEVER TRUE

## 2023-03-08 SDOH — ECONOMIC STABILITY: FOOD INSECURITY: WITHIN THE PAST 12 MONTHS, YOU WORRIED THAT YOUR FOOD WOULD RUN OUT BEFORE YOU GOT MONEY TO BUY MORE.: NEVER TRUE

## 2023-03-08 SDOH — ECONOMIC STABILITY: HOUSING INSECURITY
IN THE LAST 12 MONTHS, WAS THERE A TIME WHEN YOU DID NOT HAVE A STEADY PLACE TO SLEEP OR SLEPT IN A SHELTER (INCLUDING NOW)?: NO

## 2023-03-08 ASSESSMENT — ENCOUNTER SYMPTOMS
VOMITING: 0
NAUSEA: 0
ABDOMINAL PAIN: 0
COUGH: 0
CHEST TIGHTNESS: 0
DIARRHEA: 0
ANAL BLEEDING: 0
CONSTIPATION: 0
SHORTNESS OF BREATH: 0
BLOOD IN STOOL: 0
CHOKING: 0
WHEEZING: 0

## 2023-03-08 ASSESSMENT — PATIENT HEALTH QUESTIONNAIRE - PHQ9
SUM OF ALL RESPONSES TO PHQ QUESTIONS 1-9: 0
1. LITTLE INTEREST OR PLEASURE IN DOING THINGS: 0
SUM OF ALL RESPONSES TO PHQ QUESTIONS 1-9: 0
SUM OF ALL RESPONSES TO PHQ QUESTIONS 1-9: 0
2. FEELING DOWN, DEPRESSED OR HOPELESS: 0
SUM OF ALL RESPONSES TO PHQ QUESTIONS 1-9: 0
SUM OF ALL RESPONSES TO PHQ9 QUESTIONS 1 & 2: 0

## 2023-03-08 ASSESSMENT — VISUAL ACUITY: OU: 1

## 2023-03-08 NOTE — PROGRESS NOTES
[facility]  [facility] [address]  [address] [facility] [address]  Dept: [phone]  Dept Fax: [phone]      Prudy Gustavus. is a 56 y.o. male who presents today for hismedical conditions/complaints as noted below. Prudy Gustavus. is c/o of Hypertension (F/u)        Assessment/Plan:     1. Essential hypertension  2. Muscle spasm  -     cyclobenzaprine (FLEXERIL) 10 MG tablet; Take 1 tablet by mouth 3 times daily as needed for Muscle spasms, Disp-30 tablet, R-2Normal  3. Vaping nicotine dependence, tobacco product  4. Shift work sleep disorder  5. Terminal ileitis without complication ([facility] [address]  6. Dyslipidemia (high LDL; low HDL)  7. Erectile dysfunction, unspecified erectile dysfunction type          No follow-ups on file. HPI     Colonoscopy was cancelled due to cost - insurance refused to cover because it was not a preventive test     Hypertension-tolerating current regimen without chest pain, palpitations, dizziness, peripheral edema, dyspnea on exertion, orthopnea, paroxysmal nocturnal dyspnea. Hyperlipidemia-tolerating current regimen without myalgias, dyspepsia, jaundice. Mostly compliant with diet recommendations for low salt diet, tries to limit greasy/cheesy/fried foods, not very compliant with exercise recommendations.     Cardiovascular risk factors: advanced age (older than 54 for men, 72 for women), dyslipidemia, hypertension, male gender, sedentary lifestyle, and smoking/ tobacco exposure        BP Readings from Last 3 Encounters:   03/08/23 130/80   09/08/22 118/78   03/08/22 102/70              Past Medical History:   Diagnosis Date    Hypertension       Past Surgical History:   Procedure Laterality Date    COLONOSCOPY  09/25/2017    erosions; edema and patchy acute inflammation    [place] COLONOSCOPY W/BIOPSY SINGLE/MULTIPLE N/A 9/25/2017    COLONOSCOPY WITH BIOPSY performed by Yasir Knott MD at [address] History   Problem Relation Age of Onset    Heart Disease Father        Social History     Tobacco Use    Smoking status: Former     Packs/day: 1.00     Years: 28.00     Pack years: 28.00     Types: Cigarettes     Quit date:      Years since quittin.1    Smokeless tobacco: Never   Substance Use Topics    Alcohol use: Yes     Alcohol/week: 10.0 standard drinks     Types: 12 Standard drinks or equivalent per week     Comment: rare        No Known Allergies  Prior to Visit Medications    Medication Sig Taking? Authorizing Provider   lisinopril (PRINIVIL;ZESTRIL) 20 MG tablet take 1 tablet by mouth once daily Yes Funmilayo Subramanian MD   tadalafil (CIALIS) 5 MG tablet take 1 tablet by mouth once daily AS NEEDED FOR ERECTILE DYSFUNCTION Yes Funmilayo Subramanian MD   meloxicam (MOBIC) 15 MG tablet take 1 tablet by mouth once daily Yes Funmilayo Subramanian MD   cyclobenzaprine (FLEXERIL) 10 MG tablet Take 1 tablet by mouth 3 times daily as needed for Muscle spasms Yes Funmilayo Subramanian MD   RA ALLERGY 25 MG tablet take 2 tablets by mouth every evening if needed for sleep Yes Funmilayo Subramanian MD   Melatonin ER 10 MG TBCR Take 10 mg by mouth every evening Yes Funmilayo Subramanian MD   bisacodyl 5 MG EC tablet Take 4 tablets by mouth the morning prior to colonoscopy as instructed by your physician.  Patient not taking: Reported on 3/8/2023  Tejal Wylie MD       Review of Systems     Review of Systems   Constitutional:  Negative for fatigue, fever and unexpected weight change.   Respiratory:  Negative for cough, choking, chest tightness, shortness of breath and wheezing.    Cardiovascular:  Negative for chest pain, palpitations and leg swelling.   Gastrointestinal:  Negative for abdominal pain, anal bleeding, blood in stool, constipation, diarrhea, nausea and vomiting.   Endocrine: Negative.    Musculoskeletal:  Negative for joint swelling and myalgias.   Skin: Negative.    Neurological:  Negative for dizziness.   Psychiatric/Behavioral:  Negative for sleep  disturbance. All other systems reviewed and are negative. Objective     /80 (Site: Left Upper Arm, Position: Sitting, Cuff Size: Medium Adult)   Pulse 70   Temp 99.9 °F (37.7 °C)   Wt 158 lb 6.4 oz (71.8 kg)   SpO2 98%   BMI 25.57 kg/m²   Physical Exam  Vitals and nursing note reviewed. Constitutional:       General: He is not in acute distress. Appearance: He is well-developed. He is not ill-appearing. Eyes:      General: Lids are normal. Vision grossly intact. Cardiovascular:      Rate and Rhythm: Normal rate and regular rhythm. Heart sounds: Normal heart sounds, S1 normal and S2 normal. No murmur heard. No friction rub. No gallop. Pulmonary:      Effort: Pulmonary effort is normal. No respiratory distress. Breath sounds: Normal breath sounds. No wheezing. Abdominal:      General: Bowel sounds are normal.      Palpations: Abdomen is soft. There is no mass. Tenderness: There is no abdominal tenderness. There is no guarding. Musculoskeletal:         General: Normal range of motion. Skin:     General: Skin is warm and dry. Capillary Refill: Capillary refill takes less than 2 seconds. Neurological:      General: No focal deficit present. Mental Status: He is alert and oriented to person, place, and time. Data Review     The 10-year ASCVD risk score (Jagruti DK, et al., 2019) is: 7.9%    Values used to calculate the score:      Age: 56 years      Sex: Male      Is Non- : No      Diabetic: No      Tobacco smoker: No      Systolic Blood Pressure: 442 mmHg      Is BP treated: Yes      HDL Cholesterol: 46 mg/dL      Total Cholesterol: 217 mg/dL      Health Maintenance Due   Topic Date Due    Low dose CT lung screening  09/24/2022    Colorectal Cancer Screen  09/25/2022    Depression Screen  03/08/2023           Patient given educational materials- see patient instructions.   Discussed use, benefit, and side effects of prescribedmedications. All patient questions answered. Pt voiced understanding. Reviewedhealth maintenance. Instructed to continue current medications, diet and exercise. Patient agreed with treatment plan. Follow up as directed.      Electronically signedby Tuan Dominique MD on 3/8/2023

## 2023-03-12 DIAGNOSIS — R52 GENERALIZED BODY ACHES: ICD-10-CM

## 2023-03-13 RX ORDER — MELOXICAM 15 MG/1
TABLET ORAL
Qty: 90 TABLET | Refills: 1 | Status: SHIPPED | OUTPATIENT
Start: 2023-03-13

## 2023-03-13 NOTE — TELEPHONE ENCOUNTER
LAST VISIT:   3/8/2023     Future Appointments   Date Time Provider Guzman Aleman   9/13/2023  2:30 PM Funmilayo Collazo MD [facility]

## 2023-08-17 DIAGNOSIS — I10 ESSENTIAL HYPERTENSION: ICD-10-CM

## 2023-08-17 RX ORDER — LISINOPRIL 20 MG/1
TABLET ORAL
Qty: 90 TABLET | Refills: 0 | Status: SHIPPED | OUTPATIENT
Start: 2023-08-17

## 2023-08-17 NOTE — TELEPHONE ENCOUNTER
Last visit: 3/8/23  Last Med refill: 2/13/23  Does patient have enough medication for 72 hours: No:     Next Visit Date:  Future Appointments   Date Time Provider [address]   9/13/2023  2:30 PM Funmilayo Mcdonald MD [address] Maintenance   Topic Date Due    Low dose CT lung screening &/or counseling  09/24/2022    Colorectal Cancer Screen  09/25/2022    Flu vaccine (1) Never done    COVID-19 Vaccine (4 - Booster for Jesse Crass series) 09/08/2023 (Originally 2/5/2022)    Depression Screen  03/08/2024    Lipids  09/12/2027    DTaP/Tdap/Td vaccine (3 - Td or Tdap) 09/08/2032    Shingles vaccine  Completed    Hepatitis C screen  Completed    HIV screen  Addressed    Hepatitis A vaccine  Aged Out    Hib vaccine  Aged Out    Meningococcal (ACWY) vaccine  Aged Out    Pneumococcal 0-64 years Vaccine  Aged Out    Prostate Specific Antigen (PSA) Screening or Monitoring  Discontinued       Hemoglobin A1C (%)   Date Value   07/17/2018 5.4   09/27/2014 5.8             ( goal A1C is < 7)   No components found for: LABMICR  LDL Cholesterol (mg/dL)   Date Value   09/12/2022 149 (H)   09/13/2021 130     LDL Calculated (mg/dL)   Date Value   07/17/2018 105   06/17/2017 124       (goal LDL is <100)   AST (U/L)   Date Value   09/12/2022 22     ALT (U/L)   Date Value   09/12/2022 33     BUN (mg/dL)   Date Value   09/12/2022 17     BP Readings from Last 3 Encounters:   03/08/23 130/80   09/08/22 118/78   03/08/22 102/70          (goal 120/80)    All Future Testing planned in [facility]  Lab Frequency Next Occurrence   CT lung screen [Initial/Annual] Once 08/25/2022               Patient Active Problem List:     Dyslipidemia (high LDL; low HDL)     Essential hypertension     Positive FIT (fecal immunochemical test)     Edema of colon     Terminal ileitis without complication (HCC)     Erectile dysfunction     Tobacco abuse     Rupture of right distal biceps tendon     Shift work sleep disorder

## 2023-09-13 ENCOUNTER — OFFICE VISIT (OUTPATIENT)
Dept: FAMILY MEDICINE CLINIC | Age: 58
End: 2023-09-13
Payer: COMMERCIAL

## 2023-09-13 VITALS
OXYGEN SATURATION: 95 % | DIASTOLIC BLOOD PRESSURE: 60 MMHG | HEART RATE: 69 BPM | BODY MASS INDEX: 25.71 KG/M2 | HEIGHT: 66 IN | SYSTOLIC BLOOD PRESSURE: 134 MMHG | WEIGHT: 160 LBS

## 2023-09-13 DIAGNOSIS — F17.290 VAPING NICOTINE DEPENDENCE, TOBACCO PRODUCT: ICD-10-CM

## 2023-09-13 DIAGNOSIS — Z53.20 LUNG CANCER SCREENING DECLINED BY PATIENT: ICD-10-CM

## 2023-09-13 DIAGNOSIS — Z12.5 ENCOUNTER FOR SCREENING FOR MALIGNANT NEOPLASM OF PROSTATE: ICD-10-CM

## 2023-09-13 DIAGNOSIS — E78.5 DYSLIPIDEMIA (HIGH LDL; LOW HDL): ICD-10-CM

## 2023-09-13 DIAGNOSIS — K50.00 TERMINAL ILEITIS WITHOUT COMPLICATION (HCC): ICD-10-CM

## 2023-09-13 DIAGNOSIS — I10 ESSENTIAL HYPERTENSION: Primary | ICD-10-CM

## 2023-09-13 DIAGNOSIS — Z13.220 SCREENING, LIPID: ICD-10-CM

## 2023-09-13 DIAGNOSIS — R52 GENERALIZED BODY ACHES: ICD-10-CM

## 2023-09-13 DIAGNOSIS — Z13.0 SCREENING, ANEMIA, DEFICIENCY, IRON: ICD-10-CM

## 2023-09-13 PROCEDURE — 3075F SYST BP GE 130 - 139MM HG: CPT | Performed by: INTERNAL MEDICINE

## 2023-09-13 PROCEDURE — 3078F DIAST BP <80 MM HG: CPT | Performed by: INTERNAL MEDICINE

## 2023-09-13 PROCEDURE — 99214 OFFICE O/P EST MOD 30 MIN: CPT | Performed by: INTERNAL MEDICINE

## 2023-09-13 PROCEDURE — 3017F COLORECTAL CA SCREEN DOC REV: CPT | Performed by: INTERNAL MEDICINE

## 2023-09-13 PROCEDURE — G8419 CALC BMI OUT NRM PARAM NOF/U: HCPCS | Performed by: INTERNAL MEDICINE

## 2023-09-13 PROCEDURE — 1036F TOBACCO NON-USER: CPT | Performed by: INTERNAL MEDICINE

## 2023-09-13 PROCEDURE — G8427 DOCREV CUR MEDS BY ELIG CLIN: HCPCS | Performed by: INTERNAL MEDICINE

## 2023-09-13 RX ORDER — MELOXICAM 15 MG/1
15 TABLET ORAL DAILY
Qty: 90 TABLET | Refills: 1 | Status: SHIPPED | OUTPATIENT
Start: 2023-09-13

## 2023-09-13 ASSESSMENT — ENCOUNTER SYMPTOMS
DIARRHEA: 0
VOMITING: 0
CHEST TIGHTNESS: 0
SHORTNESS OF BREATH: 0
ANAL BLEEDING: 0
BLOOD IN STOOL: 0
WHEEZING: 0
ABDOMINAL PAIN: 0
COUGH: 0
CHOKING: 0
CONSTIPATION: 0
NAUSEA: 0

## 2023-09-13 ASSESSMENT — VISUAL ACUITY: OU: 1

## 2023-09-13 NOTE — PATIENT INSTRUCTIONS
Your labs have been ordered today as fasting labs - this means you will need to fast overnight for at least 8 hours before you come in to get the blood drawn. You may have water, black tea or coffee without sugar or creamer prior to coming in for labs. Your lab results will be released to your [employer] account as they are resulted by the lab. I will send you a message regarding your results once I have had a chance to review them, usually within a couple of days, so if you have not heard from me it means I'm either waiting for some results, or that I have not had a moment to review the results. Learning About Lung Cancer Screening  What is screening for lung cancer? Lung cancer screening is a way to find some lung cancers early, before a person has any symptoms of the cancer. Lung cancer screening may help those who have the highest risk for lung cancer--people age 48 and older who are or were heavy smokers. For most people, who aren't at increased risk, screening for lung cancer probably isn't helpful. Screening won't prevent cancer. And it may not find all lung cancers. Lung cancer screening may lower the risk of dying from lung cancer in a small number of people. How is it done? Lung cancer screening is done with a low-dose CT (computed tomography) scan. A CT scan uses X-rays, or radiation, to make detailed pictures of your body. Experts recommend that screening be done in medical centers that focus on finding and treating lung cancer. Who is screening recommended for? Lung cancer screening is recommended for people age 48 and older who are or were heavy smokers. That means people with a smoking history of at least 20 pack years. A pack year is a way to measure how heavy a smoker you are or were. To figure out your pack years, multiply how many packs a day on average (assuming 20 cigarettes per pack) you have smoked by how many years you have smoked. For example:   If you smoked 1 pack a day for

## 2023-09-18 ENCOUNTER — HOSPITAL ENCOUNTER (OUTPATIENT)
Age: 58
Setting detail: SPECIMEN
Discharge: HOME | End: 2023-09-18

## 2023-09-18 DIAGNOSIS — Z13.220 SCREENING, LIPID: ICD-10-CM

## 2023-09-18 DIAGNOSIS — Z12.5 ENCOUNTER FOR SCREENING FOR MALIGNANT NEOPLASM OF PROSTATE: ICD-10-CM

## 2023-09-18 DIAGNOSIS — Z13.0 SCREENING, ANEMIA, DEFICIENCY, IRON: ICD-10-CM

## 2023-09-18 DIAGNOSIS — I10 ESSENTIAL HYPERTENSION: ICD-10-CM

## 2023-09-18 LAB
ALBUMIN SERPL-MCNC: 4.2 G/DL (ref 3.5–5.2)
ALBUMIN/GLOB SERPL: 1.4 {RATIO} (ref 1–2.5)
ALP SERPL-CCNC: 79 U/L (ref 40–129)
ALT SERPL-CCNC: 20 U/L (ref 5–41)
ANION GAP SERPL CALCULATED.3IONS-SCNC: 13 MMOL/L (ref 9–17)
AST SERPL-CCNC: 18 U/L
BASOPHILS # BLD: <0.03 K/UL (ref 0–0.2)
BASOPHILS NFR BLD: 0 % (ref 0–2)
BILIRUB SERPL-MCNC: 0.6 MG/DL (ref 0.3–1.2)
BUN SERPL-MCNC: 13 MG/DL (ref 6–20)
CALCIUM SERPL-MCNC: 9.3 MG/DL (ref 8.6–10.4)
CHLORIDE SERPL-SCNC: 103 MMOL/L (ref 98–107)
CHOLEST SERPL-MCNC: 174 MG/DL
CHOLESTEROL/HDL RATIO: 4.1
CO2 SERPL-SCNC: 23 MMOL/L (ref 20–31)
CREAT SERPL-MCNC: 0.9 MG/DL (ref 0.7–1.2)
EOSINOPHIL # BLD: 0.1 K/UL (ref 0–0.44)
EOSINOPHILS RELATIVE PERCENT: 1 % (ref 1–4)
ERYTHROCYTE [DISTWIDTH] IN BLOOD BY AUTOMATED COUNT: 13.2 % (ref 11.8–14.4)
GFR SERPL CREATININE-BSD FRML MDRD: >60 ML/MIN/1.73M2
GLUCOSE SERPL-MCNC: 86 MG/DL (ref 70–99)
HCT VFR BLD AUTO: 47.1 % (ref 40.7–50.3)
HDLC SERPL-MCNC: 42 MG/DL
HGB BLD-MCNC: 14.7 G/DL (ref 13–17)
IMM GRANULOCYTES # BLD AUTO: 0.04 K/UL (ref 0–0.3)
IMM GRANULOCYTES NFR BLD: 1 %
LDLC SERPL CALC-MCNC: 109 MG/DL (ref 0–130)
LYMPHOCYTES NFR BLD: 1.86 K/UL (ref 1.1–3.7)
LYMPHOCYTES RELATIVE PERCENT: 22 % (ref 24–43)
MCH RBC QN AUTO: 29.5 PG (ref 25.2–33.5)
MCHC RBC AUTO-ENTMCNC: 31.2 G/DL (ref 28.4–34.8)
MCV RBC AUTO: 94.4 FL (ref 82.6–102.9)
MONOCYTES NFR BLD: 0.52 K/UL (ref 0.1–1.2)
MONOCYTES NFR BLD: 6 % (ref 3–12)
NEUTROPHILS NFR BLD: 70 % (ref 36–65)
NEUTS SEG NFR BLD: 6.13 K/UL (ref 1.5–8.1)
NRBC BLD-RTO: 0 PER 100 WBC
PLATELET # BLD AUTO: 273 K/UL (ref 138–453)
PMV BLD AUTO: 9.5 FL (ref 8.1–13.5)
POTASSIUM SERPL-SCNC: 4.6 MMOL/L (ref 3.7–5.3)
PROT SERPL-MCNC: 7.2 G/DL (ref 6.4–8.3)
PSA SERPL-MCNC: 1.29 NG/ML
RBC # BLD AUTO: 4.99 M/UL (ref 4.21–5.77)
SODIUM SERPL-SCNC: 139 MMOL/L (ref 135–144)
TRIGL SERPL-MCNC: 115 MG/DL
WBC OTHER # BLD: 8.7 K/UL (ref 3.5–11.3)

## 2023-09-19 NOTE — RESULT ENCOUNTER NOTE
Patient notified via [facility]-  Cholesterol is improved remarkably since last year, continue current diet and lifestyle management. All other labs are stable.   Let me know if you have questions, will recheck labs in 1 year.    -Dr. Osmar Kevin.

## 2023-11-13 DIAGNOSIS — I10 ESSENTIAL HYPERTENSION: ICD-10-CM

## 2023-11-13 NOTE — TELEPHONE ENCOUNTER
Last visit: 9/13/23  Last Med refill: 8/17/23  Does patient have enough medication for 72 hours: Yes    Next Visit Date:  Future Appointments   Date Time Provider [address]   3/13/2024  2:00 PM Gopi Caro MD [address] Maintenance   Topic Date Due    Hepatitis B vaccine (1 of 3 - 3-dose series) Never done    COVID-19 Vaccine (4 - 2023-24 season) 09/01/2023    Flu vaccine (1) 09/13/2024 (Originally 8/1/2023)    Depression Screen  03/08/2024    Colorectal Cancer Screen  09/13/2024    Low dose CT lung screening &/or counseling  09/13/2024    Lipids  09/18/2028    DTaP/Tdap/Td vaccine (3 - Td or Tdap) 09/08/2032    Shingles vaccine  Completed    Hepatitis C screen  Completed    HIV screen  Addressed    Hepatitis A vaccine  Aged Out    Hib vaccine  Aged Out    Meningococcal (ACWY) vaccine  Aged Out    Pneumococcal 0-64 years Vaccine  Aged Out    Prostate Specific Antigen (PSA) Screening or Monitoring  Discontinued       Hemoglobin A1C (%)   Date Value   07/17/2018 5.4   09/27/2014 5.8             ( goal A1C is < 7)   No components found for: \"LABMICR\"  LDL Cholesterol (mg/dL)   Date Value   09/18/2023 109   09/12/2022 149 (H)     LDL Calculated (mg/dL)   Date Value   07/17/2018 105   06/17/2017 124       (goal LDL is <100)   AST (U/L)   Date Value   09/18/2023 18     ALT (U/L)   Date Value   09/18/2023 20     BUN (mg/dL)   Date Value   09/18/2023 13     BP Readings from Last 3 Encounters:   09/13/23 134/60   03/08/23 130/80   09/08/22 118/78          (goal 120/80)    All Future Testing planned in [facility]  Lab Frequency Next Occurrence               Patient Active Problem List:     Dyslipidemia (high LDL; low HDL)     Essential hypertension     Positive FIT (fecal immunochemical test)     Edema of colon     Terminal ileitis without complication (HCC)     Erectile dysfunction     Tobacco abuse     Rupture of right distal biceps tendon     Shift work sleep disorder

## 2023-11-14 RX ORDER — LISINOPRIL 20 MG/1
TABLET ORAL
Qty: 90 TABLET | Refills: 1 | Status: SHIPPED | OUTPATIENT
Start: 2023-11-14

## 2024-03-13 ENCOUNTER — OFFICE VISIT (OUTPATIENT)
Dept: FAMILY MEDICINE CLINIC | Age: 59
End: 2024-03-13
Payer: COMMERCIAL

## 2024-03-13 VITALS
DIASTOLIC BLOOD PRESSURE: 76 MMHG | WEIGHT: 160 LBS | SYSTOLIC BLOOD PRESSURE: 130 MMHG | HEART RATE: 73 BPM | TEMPERATURE: 98.4 F | BODY MASS INDEX: 25.82 KG/M2 | OXYGEN SATURATION: 96 %

## 2024-03-13 DIAGNOSIS — K50.00 TERMINAL ILEITIS WITHOUT COMPLICATION (HCC): ICD-10-CM

## 2024-03-13 DIAGNOSIS — Z12.5 ENCOUNTER FOR SCREENING FOR MALIGNANT NEOPLASM OF PROSTATE: ICD-10-CM

## 2024-03-13 DIAGNOSIS — I10 ESSENTIAL HYPERTENSION: Primary | ICD-10-CM

## 2024-03-13 DIAGNOSIS — G47.26 SHIFT WORK SLEEP DISORDER: ICD-10-CM

## 2024-03-13 DIAGNOSIS — F17.290 VAPING NICOTINE DEPENDENCE, TOBACCO PRODUCT: ICD-10-CM

## 2024-03-13 DIAGNOSIS — Z13.29 SCREENING FOR THYROID DISORDER: ICD-10-CM

## 2024-03-13 DIAGNOSIS — R52 GENERALIZED BODY ACHES: ICD-10-CM

## 2024-03-13 DIAGNOSIS — E78.5 DYSLIPIDEMIA (HIGH LDL; LOW HDL): ICD-10-CM

## 2024-03-13 DIAGNOSIS — M62.838 MUSCLE SPASM: ICD-10-CM

## 2024-03-13 PROCEDURE — G8427 DOCREV CUR MEDS BY ELIG CLIN: HCPCS | Performed by: INTERNAL MEDICINE

## 2024-03-13 PROCEDURE — 3075F SYST BP GE 130 - 139MM HG: CPT | Performed by: INTERNAL MEDICINE

## 2024-03-13 PROCEDURE — 4004F PT TOBACCO SCREEN RCVD TLK: CPT | Performed by: INTERNAL MEDICINE

## 2024-03-13 PROCEDURE — 99214 OFFICE O/P EST MOD 30 MIN: CPT | Performed by: INTERNAL MEDICINE

## 2024-03-13 PROCEDURE — 3017F COLORECTAL CA SCREEN DOC REV: CPT | Performed by: INTERNAL MEDICINE

## 2024-03-13 PROCEDURE — 3078F DIAST BP <80 MM HG: CPT | Performed by: INTERNAL MEDICINE

## 2024-03-13 PROCEDURE — G8419 CALC BMI OUT NRM PARAM NOF/U: HCPCS | Performed by: INTERNAL MEDICINE

## 2024-03-13 PROCEDURE — G8484 FLU IMMUNIZE NO ADMIN: HCPCS | Performed by: INTERNAL MEDICINE

## 2024-03-13 RX ORDER — MELOXICAM 15 MG/1
15 TABLET ORAL DAILY
Qty: 90 TABLET | Refills: 1 | Status: SHIPPED | OUTPATIENT
Start: 2024-03-13

## 2024-03-13 RX ORDER — CYCLOBENZAPRINE HCL 10 MG
10 TABLET ORAL 3 TIMES DAILY PRN
Qty: 30 TABLET | Refills: 2 | Status: SHIPPED | OUTPATIENT
Start: 2024-03-13

## 2024-03-13 SDOH — ECONOMIC STABILITY: INCOME INSECURITY: HOW HARD IS IT FOR YOU TO PAY FOR THE VERY BASICS LIKE FOOD, HOUSING, MEDICAL CARE, AND HEATING?: NOT HARD AT ALL

## 2024-03-13 SDOH — ECONOMIC STABILITY: FOOD INSECURITY: WITHIN THE PAST 12 MONTHS, YOU WORRIED THAT YOUR FOOD WOULD RUN OUT BEFORE YOU GOT MONEY TO BUY MORE.: NEVER TRUE

## 2024-03-13 SDOH — ECONOMIC STABILITY: FOOD INSECURITY: WITHIN THE PAST 12 MONTHS, THE FOOD YOU BOUGHT JUST DIDN'T LAST AND YOU DIDN'T HAVE MONEY TO GET MORE.: NEVER TRUE

## 2024-03-13 ASSESSMENT — ENCOUNTER SYMPTOMS
WHEEZING: 0
DIARRHEA: 0
SHORTNESS OF BREATH: 0
CHEST TIGHTNESS: 0
ABDOMINAL PAIN: 0
CONSTIPATION: 0
NAUSEA: 0
ANAL BLEEDING: 0
COUGH: 0
CHOKING: 0
BLOOD IN STOOL: 0
VOMITING: 0

## 2024-03-13 ASSESSMENT — PATIENT HEALTH QUESTIONNAIRE - PHQ9
SUM OF ALL RESPONSES TO PHQ QUESTIONS 1-9: 0
2. FEELING DOWN, DEPRESSED OR HOPELESS: 0
SUM OF ALL RESPONSES TO PHQ QUESTIONS 1-9: 0
1. LITTLE INTEREST OR PLEASURE IN DOING THINGS: 0
SUM OF ALL RESPONSES TO PHQ QUESTIONS 1-9: 0
SUM OF ALL RESPONSES TO PHQ QUESTIONS 1-9: 0
SUM OF ALL RESPONSES TO PHQ9 QUESTIONS 1 & 2: 0

## 2024-03-13 ASSESSMENT — VISUAL ACUITY: OU: 1

## 2024-05-06 DIAGNOSIS — I10 ESSENTIAL HYPERTENSION: ICD-10-CM

## 2024-05-06 RX ORDER — LISINOPRIL 20 MG/1
TABLET ORAL
Qty: 90 TABLET | Refills: 1 | Status: SHIPPED | OUTPATIENT
Start: 2024-05-06

## 2024-05-06 NOTE — TELEPHONE ENCOUNTER
Last visit: 03/13/2024  Last Med refill: 04/09/2024  Does patient have enough medication for 72 hours: No:     Next Visit Date:  Future Appointments   Date Time Provider Department Center   9/18/2024  1:00 PM Funmilayo Subramanian MD [facility]       Health Maintenance   Topic Date Due    Hepatitis B vaccine (1 of 3 - 3-dose series) Never done    COVID-19 Vaccine (4 - 2023-24 season) 09/01/2023    Flu vaccine (Season Ended) 09/13/2024 (Originally 8/1/2024)    Colorectal Cancer Screen  09/13/2024    Low dose CT lung screening &/or counseling  09/13/2024    Depression Screen  03/13/2025    Lipids  09/18/2028    DTaP/Tdap/Td vaccine (3 - Td or Tdap) 09/08/2032    Shingles vaccine  Completed    Hepatitis C screen  Completed    HIV screen  Addressed    Hepatitis A vaccine  Aged Out    Hib vaccine  Aged Out    Polio vaccine  Aged Out    Meningococcal (ACWY) vaccine  Aged Out    Pneumococcal 0-64 years Vaccine  Aged Out    Prostate Specific Antigen (PSA) Screening or Monitoring  Discontinued       Hemoglobin A1C (%)   Date Value   07/17/2018 5.4   09/27/2014 5.8             ( goal A1C is < 7)   No components found for: \"LABMICR\"  No components found for: \"LDLCHOLESTEROL\", \"LDLCALC\"    (goal LDL is <100)   AST (U/L)   Date Value   09/18/2023 18     ALT (U/L)   Date Value   09/18/2023 20     BUN (mg/dL)   Date Value   09/18/2023 13     BP Readings from Last 3 Encounters:   03/13/24 130/76   09/13/23 134/60   03/08/23 130/80          (goal 120/80)    All Future Testing planned in [facility]  Lab Frequency Next Occurrence   Lipid, Fasting Once 03/13/2024   Comprehensive Metabolic Panel Once 03/13/2024   PSA Screening Once 03/13/2024   TSH With Reflex Ft4 Once 03/13/2024               Patient Active Problem List:     Dyslipidemia (high LDL; low HDL)     Essential hypertension     Positive FIT (fecal immunochemical test)     Edema of colon     Terminal ileitis without complication (HCC)     Erectile dysfunction     Tobacco

## 2024-05-11 DIAGNOSIS — I10 ESSENTIAL HYPERTENSION: ICD-10-CM

## 2024-05-11 DIAGNOSIS — N52.9 ERECTILE DYSFUNCTION, UNSPECIFIED ERECTILE DYSFUNCTION TYPE: ICD-10-CM

## 2024-05-13 RX ORDER — LISINOPRIL 20 MG/1
TABLET ORAL
Qty: 90 TABLET | Refills: 1 | OUTPATIENT
Start: 2024-05-13

## 2024-05-13 RX ORDER — TADALAFIL 5 MG/1
TABLET ORAL
Qty: 30 TABLET | Refills: 1 | Status: SHIPPED | OUTPATIENT
Start: 2024-05-13

## 2024-05-13 NOTE — TELEPHONE ENCOUNTER
Last visit: 3/13/24  Last Med refill: 6/13/23  Does patient have enough medication for 72 hours: No:     Next Visit Date:  Future Appointments   Date Time Provider Department Center   9/18/2024  1:00 PM Funmilayo Subramanian MD [place]  [facility]       Health Maintenance   Topic Date Due    Hepatitis B vaccine (1 of 3 - 3-dose series) Never done    COVID-19 Vaccine (4 - 2023-24 season) 09/01/2023    Flu vaccine (Season Ended) 09/13/2024 (Originally 8/1/2024)    Colorectal Cancer Screen  09/13/2024    Low dose CT lung screening &/or counseling  09/13/2024    Depression Screen  03/13/2025    Lipids  09/18/2028    DTaP/Tdap/Td vaccine (3 - Td or Tdap) 09/08/2032    Shingles vaccine  Completed    Hepatitis C screen  Completed    HIV screen  Addressed    Hepatitis A vaccine  Aged Out    Hib vaccine  Aged Out    Polio vaccine  Aged Out    Meningococcal (ACWY) vaccine  Aged Out    Pneumococcal 0-64 years Vaccine  Aged Out    Prostate Specific Antigen (PSA) Screening or Monitoring  Discontinued       Hemoglobin A1C (%)   Date Value   07/17/2018 5.4   09/27/2014 5.8             ( goal A1C is < 7)   No components found for: \"LABMICR\"  No components found for: \"LDLCHOLESTEROL\", \"LDLCALC\"    (goal LDL is <100)   AST (U/L)   Date Value   09/18/2023 18     ALT (U/L)   Date Value   09/18/2023 20     BUN (mg/dL)   Date Value   09/18/2023 13     BP Readings from Last 3 Encounters:   03/13/24 130/76   09/13/23 134/60   03/08/23 130/80          (goal 120/80)    All Future Testing planned in [facility]  Lab Frequency Next Occurrence   Lipid, Fasting Once 03/13/2024   Comprehensive Metabolic Panel Once 03/13/2024   PSA Screening Once 03/13/2024   TSH With Reflex Ft4 Once 03/13/2024               Patient Active Problem List:     Dyslipidemia (high LDL; low HDL)     Essential hypertension     Positive FIT (fecal immunochemical test)     Edema of colon     Terminal ileitis without complication (HCC)     Erectile dysfunction     Tobacco

## 2024-09-03 ENCOUNTER — HOSPITAL ENCOUNTER (OUTPATIENT)
Age: 59
Setting detail: SPECIMEN
Discharge: HOME | End: 2024-09-03

## 2024-09-03 DIAGNOSIS — Z12.5 ENCOUNTER FOR SCREENING FOR MALIGNANT NEOPLASM OF PROSTATE: ICD-10-CM

## 2024-09-03 DIAGNOSIS — Z13.29 SCREENING FOR THYROID DISORDER: ICD-10-CM

## 2024-09-03 DIAGNOSIS — I10 ESSENTIAL HYPERTENSION: ICD-10-CM

## 2024-09-03 DIAGNOSIS — E78.5 DYSLIPIDEMIA (HIGH LDL; LOW HDL): ICD-10-CM

## 2024-09-03 LAB
ALBUMIN SERPL-MCNC: 4.4 G/DL (ref 3.5–5.2)
ALBUMIN/GLOB SERPL: 1 {RATIO} (ref 1–2.5)
ALP SERPL-CCNC: 82 U/L (ref 40–129)
ALT SERPL-CCNC: 27 U/L (ref 10–50)
ANION GAP SERPL CALCULATED.3IONS-SCNC: 10 MMOL/L (ref 9–16)
AST SERPL-CCNC: 28 U/L (ref 10–50)
BILIRUB SERPL-MCNC: 0.7 MG/DL (ref 0–1.2)
BUN SERPL-MCNC: 17 MG/DL (ref 6–20)
CALCIUM SERPL-MCNC: 9.1 MG/DL (ref 8.6–10.4)
CHLORIDE SERPL-SCNC: 103 MMOL/L (ref 98–107)
CHOLEST SERPL-MCNC: 212 MG/DL (ref 0–199)
CHOLESTEROL/HDL RATIO: 6
CO2 SERPL-SCNC: 23 MMOL/L (ref 20–31)
CREAT SERPL-MCNC: 1 MG/DL (ref 0.7–1.2)
GFR, ESTIMATED: 89 ML/MIN/1.73M2
GLUCOSE SERPL-MCNC: 83 MG/DL (ref 74–99)
HDLC SERPL-MCNC: 38 MG/DL
LDLC SERPL CALC-MCNC: 134 MG/DL (ref 0–100)
POTASSIUM SERPL-SCNC: 4.8 MMOL/L (ref 3.7–5.3)
PROT SERPL-MCNC: 7.6 G/DL (ref 6.6–8.7)
SODIUM SERPL-SCNC: 136 MMOL/L (ref 136–145)
TRIGL SERPL-MCNC: 201 MG/DL (ref 0–149)
TSH SERPL DL<=0.05 MIU/L-ACNC: 0.99 UIU/ML (ref 0.27–4.2)
VLDLC SERPL CALC-MCNC: 40 MG/DL

## 2024-09-04 LAB — PSA SERPL-MCNC: 1.3 NG/ML (ref 0–4)

## 2024-09-05 NOTE — RESULT ENCOUNTER NOTE
The 10-year ASCVD risk score (Jagruti KEYS, et al., 2019) is: 10.6%    Values used to calculate the score:      Age: 59 years      Sex: Male      Is Non- : No      Diabetic: No      Tobacco smoker: No      Systolic Blood Pressure: 130 mmHg      Is BP treated: Yes      HDL Cholesterol: 38 mg/dL      Total Cholesterol: 212 mg/dL    Patient notified via [employer]-  Cholesterol is markedly increased from what it was 1 year ago.  Cardiovascular risk score is 10.6% risk of heart attack, stroke or other cardiovascular event over the next 10 years-strongly recommend restarting cholesterol medication.  If you are agreeable, will send that in.  Otherwise stable labs, will recheck in 1 year.  Let me know if you have questions.

## 2024-09-08 DIAGNOSIS — R52 GENERALIZED BODY ACHES: ICD-10-CM

## 2024-09-09 RX ORDER — MELOXICAM 15 MG/1
15 TABLET ORAL DAILY
Qty: 30 TABLET | Refills: 5 | Status: SHIPPED | OUTPATIENT
Start: 2024-09-09

## 2024-09-18 ENCOUNTER — OFFICE VISIT (OUTPATIENT)
Dept: FAMILY MEDICINE CLINIC | Age: 59
End: 2024-09-18
Payer: COMMERCIAL

## 2024-09-18 VITALS
BODY MASS INDEX: 26.58 KG/M2 | SYSTOLIC BLOOD PRESSURE: 138 MMHG | HEIGHT: 66 IN | OXYGEN SATURATION: 97 % | DIASTOLIC BLOOD PRESSURE: 84 MMHG | HEART RATE: 99 BPM | TEMPERATURE: 100 F | WEIGHT: 165.4 LBS

## 2024-09-18 DIAGNOSIS — U07.1 COVID-19: Primary | ICD-10-CM

## 2024-09-18 DIAGNOSIS — R68.89 FLU-LIKE SYMPTOMS: ICD-10-CM

## 2024-09-18 DIAGNOSIS — I10 ESSENTIAL HYPERTENSION: ICD-10-CM

## 2024-09-18 DIAGNOSIS — K50.00 TERMINAL ILEITIS WITHOUT COMPLICATION (HCC): ICD-10-CM

## 2024-09-18 DIAGNOSIS — E78.00 HYPERCHOLESTEROLEMIA: ICD-10-CM

## 2024-09-18 LAB
INFLUENZA A ANTIGEN, POC: NEGATIVE
INFLUENZA B ANTIGEN, POC: NEGATIVE
LOT EXPIRE DATE: ABNORMAL
LOT KIT NUMBER: ABNORMAL
SARS-COV-2 RNA, POC: POSITIVE
VALID INTERNAL CONTROL: PRESENT
VENDOR AND KIT NAME POC: ABNORMAL

## 2024-09-18 PROCEDURE — 4004F PT TOBACCO SCREEN RCVD TLK: CPT | Performed by: INTERNAL MEDICINE

## 2024-09-18 PROCEDURE — 3075F SYST BP GE 130 - 139MM HG: CPT | Performed by: INTERNAL MEDICINE

## 2024-09-18 PROCEDURE — G8427 DOCREV CUR MEDS BY ELIG CLIN: HCPCS | Performed by: INTERNAL MEDICINE

## 2024-09-18 PROCEDURE — 99214 OFFICE O/P EST MOD 30 MIN: CPT | Performed by: INTERNAL MEDICINE

## 2024-09-18 PROCEDURE — G8419 CALC BMI OUT NRM PARAM NOF/U: HCPCS | Performed by: INTERNAL MEDICINE

## 2024-09-18 PROCEDURE — 3079F DIAST BP 80-89 MM HG: CPT | Performed by: INTERNAL MEDICINE

## 2024-09-18 PROCEDURE — 3017F COLORECTAL CA SCREEN DOC REV: CPT | Performed by: INTERNAL MEDICINE

## 2024-09-18 PROCEDURE — 87428 SARSCOV & INF VIR A&B AG IA: CPT | Performed by: INTERNAL MEDICINE

## 2024-09-18 RX ORDER — ATORVASTATIN CALCIUM 20 MG/1
20 TABLET, FILM COATED ORAL DAILY
Qty: 90 TABLET | Refills: 0 | Status: SHIPPED | OUTPATIENT
Start: 2024-09-18

## 2024-09-18 ASSESSMENT — ENCOUNTER SYMPTOMS
CHEST TIGHTNESS: 0
EYES NEGATIVE: 1
ABDOMINAL PAIN: 0
BLOOD IN STOOL: 0
COUGH: 0
VOMITING: 0
CONSTIPATION: 0
RHINORRHEA: 1
NAUSEA: 0
CHOKING: 0
SORE THROAT: 1
SINUS PRESSURE: 0
SINUS PAIN: 0
SHORTNESS OF BREATH: 0
ANAL BLEEDING: 0
WHEEZING: 0
DIARRHEA: 0

## 2024-11-11 DIAGNOSIS — N52.9 ERECTILE DYSFUNCTION, UNSPECIFIED ERECTILE DYSFUNCTION TYPE: ICD-10-CM

## 2024-11-11 DIAGNOSIS — I10 ESSENTIAL HYPERTENSION: ICD-10-CM

## 2024-11-11 RX ORDER — LISINOPRIL 20 MG/1
TABLET ORAL
Qty: 30 TABLET | Refills: 0 | Status: SHIPPED | OUTPATIENT
Start: 2024-11-11

## 2024-11-11 RX ORDER — TADALAFIL 5 MG/1
5 TABLET ORAL DAILY PRN
Qty: 30 TABLET | Refills: 1 | Status: SHIPPED | OUTPATIENT
Start: 2024-11-11

## 2024-11-11 NOTE — TELEPHONE ENCOUNTER
Last visit: 09/18/2024  Last Med refill: 05/06/2024 05/13/2024  Does patient have enough medication for 72 hours: NO    Next Visit Date:  No future appointments.    Health Maintenance   Topic Date Due    Hepatitis B vaccine (1 of 3 - 19+ 3-dose series) Never done    Flu vaccine (1) Never done    COVID-19 Vaccine (4 - 2023-24 season) 09/01/2024    Colorectal Cancer Screen  09/13/2024    Lung Cancer Screening &/or Counseling  09/13/2024    Depression Screen  03/13/2025    Lipids  09/03/2025    DTaP/Tdap/Td vaccine (3 - Td or Tdap) 09/08/2032    Shingles vaccine  Completed    Hepatitis C screen  Completed    HIV screen  Addressed    Hepatitis A vaccine  Aged Out    Hib vaccine  Aged Out    Polio vaccine  Aged Out    Meningococcal (ACWY) vaccine  Aged Out    Pneumococcal 0-64 years Vaccine  Aged Out    Prostate Specific Antigen (PSA) Screening or Monitoring  Discontinued       Hemoglobin A1C (%)   Date Value   07/17/2018 5.4   09/27/2014 5.8             ( goal A1C is < 7)   No components found for: \"LABMICR\"  No components found for: \"LDLCHOLESTEROL\", \"LDLCALC\"    (goal LDL is <100)   AST (U/L)   Date Value   09/03/2024 28     ALT (U/L)   Date Value   09/03/2024 27     BUN (mg/dL)   Date Value   09/03/2024 17     BP Readings from Last 3 Encounters:   09/18/24 138/84   03/13/24 130/76   09/13/23 134/60          (goal 120/80)    All Future Testing planned in [facility]  Lab Frequency Next Occurrence               Patient Active Problem List:     Dyslipidemia (high LDL; low HDL)     Essential hypertension     Positive FIT (fecal immunochemical test)     Edema of colon     Terminal ileitis without complication (HCC)     Erectile dysfunction     Tobacco abuse     Rupture of right distal biceps tendon     Shift work sleep disorder     Vaping nicotine dependence, tobacco product

## 2024-12-11 DIAGNOSIS — E78.00 HYPERCHOLESTEROLEMIA: ICD-10-CM

## 2024-12-11 NOTE — TELEPHONE ENCOUNTER
Last visit: 9/18/24  Last Med refill: 9/18/24  Does patient have enough medication for 72 hours: Yes    Next Visit Date:  No future appointments.    Health Maintenance   Topic Date Due    Hepatitis B vaccine (1 of 3 - 19+ 3-dose series) Never done    Flu vaccine (1) Never done    COVID-19 Vaccine (4 - 2023-24 season) 09/01/2024    Colorectal Cancer Screen  09/13/2024    Lung Cancer Screening &/or Counseling  09/13/2024    Depression Screen  03/13/2025    Lipids  09/03/2025    DTaP/Tdap/Td vaccine (3 - Td or Tdap) 09/08/2032    Shingles vaccine  Completed    Hepatitis C screen  Completed    HIV screen  Addressed    Hepatitis A vaccine  Aged Out    Hib vaccine  Aged Out    Polio vaccine  Aged Out    Meningococcal (ACWY) vaccine  Aged Out    Pneumococcal 0-64 years Vaccine  Aged Out    Prostate Specific Antigen (PSA) Screening or Monitoring  Discontinued       Hemoglobin A1C (%)   Date Value   07/17/2018 5.4   09/27/2014 5.8             ( goal A1C is < 7)   No components found for: \"LABMICR\"  No components found for: \"LDLCHOLESTEROL\", \"LDLCALC\"    (goal LDL is <100)   AST (U/L)   Date Value   09/03/2024 28     ALT (U/L)   Date Value   09/03/2024 27     BUN (mg/dL)   Date Value   09/03/2024 17     BP Readings from Last 3 Encounters:   09/18/24 138/84   03/13/24 130/76   09/13/23 134/60          (goal 120/80)    All Future Testing planned in [facility]  Lab Frequency Next Occurrence               Patient Active Problem List:     Dyslipidemia (high LDL; low HDL)     Essential hypertension     Positive FIT (fecal immunochemical test)     Edema of colon     Terminal ileitis without complication (HCC)     Erectile dysfunction     Tobacco abuse     Rupture of right distal biceps tendon     Shift work sleep disorder     Vaping nicotine dependence, tobacco product

## 2024-12-12 DIAGNOSIS — I10 ESSENTIAL HYPERTENSION: ICD-10-CM

## 2024-12-12 RX ORDER — ATORVASTATIN CALCIUM 20 MG/1
20 TABLET, FILM COATED ORAL DAILY
Qty: 90 TABLET | Refills: 1 | Status: SHIPPED | OUTPATIENT
Start: 2024-12-12

## 2024-12-12 RX ORDER — LISINOPRIL 20 MG/1
20 TABLET ORAL DAILY
Qty: 90 TABLET | Refills: 1 | Status: SHIPPED | OUTPATIENT
Start: 2024-12-12

## 2024-12-12 NOTE — TELEPHONE ENCOUNTER
Last visit: 09/18/2024  Last Med refill: 11/11/2024  Does patient have enough medication for 72 hours: No    Next Visit Date:  No future appointments.    Health Maintenance   Topic Date Due    Hepatitis B vaccine (1 of 3 - 19+ 3-dose series) Never done    Flu vaccine (1) Never done    COVID-19 Vaccine (4 - 2023-24 season) 09/01/2024    Colorectal Cancer Screen  09/13/2024    Lung Cancer Screening &/or Counseling  09/13/2024    Depression Screen  03/13/2025    Lipids  09/03/2025    DTaP/Tdap/Td vaccine (3 - Td or Tdap) 09/08/2032    Shingles vaccine  Completed    Hepatitis C screen  Completed    HIV screen  Addressed    Hepatitis A vaccine  Aged Out    Hib vaccine  Aged Out    Polio vaccine  Aged Out    Meningococcal (ACWY) vaccine  Aged Out    Pneumococcal 0-64 years Vaccine  Aged Out    Prostate Specific Antigen (PSA) Screening or Monitoring  Discontinued       Hemoglobin A1C (%)   Date Value   07/17/2018 5.4   09/27/2014 5.8             ( goal A1C is < 7)   No components found for: \"LABMICR\"  No components found for: \"LDLCHOLESTEROL\", \"LDLCALC\"    (goal LDL is <100)   AST (U/L)   Date Value   09/03/2024 28     ALT (U/L)   Date Value   09/03/2024 27     BUN (mg/dL)   Date Value   09/03/2024 17     BP Readings from Last 3 Encounters:   09/18/24 138/84   03/13/24 130/76   09/13/23 134/60          (goal 120/80)    All Future Testing planned in [facility]  Lab Frequency Next Occurrence               Patient Active Problem List:     Dyslipidemia (high LDL; low HDL)     Essential hypertension     Positive FIT (fecal immunochemical test)     Edema of colon     Terminal ileitis without complication (HCC)     Erectile dysfunction     Tobacco abuse     Rupture of right distal biceps tendon     Shift work sleep disorder     Vaping nicotine dependence, tobacco product

## 2025-01-13 DIAGNOSIS — M62.838 MUSCLE SPASM: ICD-10-CM

## 2025-01-13 RX ORDER — CYCLOBENZAPRINE HCL 10 MG
TABLET ORAL
Qty: 30 TABLET | Refills: 2 | Status: SHIPPED | OUTPATIENT
Start: 2025-01-13

## 2025-01-13 NOTE — TELEPHONE ENCOUNTER
Last visit: 09/18/2024  Last Med refill: 03/13/2024  Does patient have enough medication for 72 hours: No    Next Visit Date:  No future appointments.    Health Maintenance   Topic Date Due    Hepatitis B vaccine (1 of 3 - 19+ 3-dose series) Never done    Flu vaccine (1) Never done    COVID-19 Vaccine (4 - 2023-24 season) 09/01/2024    Colorectal Cancer Screen  09/13/2024    Lung Cancer Screening &/or Counseling  09/13/2024    Depression Screen  03/13/2025    Lipids  09/03/2025    DTaP/Tdap/Td vaccine (3 - Td or Tdap) 09/08/2032    Shingles vaccine  Completed    Hepatitis C screen  Completed    HIV screen  Addressed    Hepatitis A vaccine  Aged Out    Hib vaccine  Aged Out    Polio vaccine  Aged Out    Meningococcal (ACWY) vaccine  Aged Out    Pneumococcal 0-64 years Vaccine  Aged Out    Prostate Specific Antigen (PSA) Screening or Monitoring  Discontinued       Hemoglobin A1C (%)   Date Value   07/17/2018 5.4   09/27/2014 5.8             ( goal A1C is < 7)   No components found for: \"LABMICR\"  No components found for: \"LDLCHOLESTEROL\", \"LDLCALC\"    (goal LDL is <100)   AST (U/L)   Date Value   09/03/2024 28     ALT (U/L)   Date Value   09/03/2024 27     BUN (mg/dL)   Date Value   09/03/2024 17     BP Readings from Last 3 Encounters:   09/18/24 138/84   03/13/24 130/76   09/13/23 134/60          (goal 120/80)    All Future Testing planned in [facility]  Lab Frequency Next Occurrence               Patient Active Problem List:     Dyslipidemia (high LDL; low HDL)     Essential hypertension     Positive FIT (fecal immunochemical test)     Edema of colon     Terminal ileitis without complication (HCC)     Erectile dysfunction     Tobacco abuse     Rupture of right distal biceps tendon     Shift work sleep disorder     Vaping nicotine dependence, tobacco product

## 2025-03-10 DIAGNOSIS — R52 GENERALIZED BODY ACHES: ICD-10-CM

## 2025-03-10 RX ORDER — MELOXICAM 15 MG/1
15 TABLET ORAL DAILY
Qty: 30 TABLET | Refills: 5 | Status: SHIPPED | OUTPATIENT
Start: 2025-03-10

## 2025-03-18 ENCOUNTER — OFFICE VISIT (OUTPATIENT)
Dept: FAMILY MEDICINE CLINIC | Age: 60
End: 2025-03-18
Payer: COMMERCIAL

## 2025-03-18 VITALS
OXYGEN SATURATION: 99 % | HEART RATE: 77 BPM | BODY MASS INDEX: 25.34 KG/M2 | WEIGHT: 157 LBS | DIASTOLIC BLOOD PRESSURE: 82 MMHG | SYSTOLIC BLOOD PRESSURE: 132 MMHG

## 2025-03-18 DIAGNOSIS — L98.9 SKIN LESION: ICD-10-CM

## 2025-03-18 DIAGNOSIS — Z87.891 PERSONAL HISTORY OF TOBACCO USE: ICD-10-CM

## 2025-03-18 DIAGNOSIS — E78.5 DYSLIPIDEMIA (HIGH LDL; LOW HDL): ICD-10-CM

## 2025-03-18 DIAGNOSIS — Z12.11 COLON CANCER SCREENING: ICD-10-CM

## 2025-03-18 DIAGNOSIS — K50.00 TERMINAL ILEITIS WITHOUT COMPLICATION (HCC): ICD-10-CM

## 2025-03-18 DIAGNOSIS — I10 ESSENTIAL HYPERTENSION: Primary | ICD-10-CM

## 2025-03-18 DIAGNOSIS — F17.290 VAPING NICOTINE DEPENDENCE, TOBACCO PRODUCT: ICD-10-CM

## 2025-03-18 DIAGNOSIS — Z53.20 LUNG CANCER SCREENING DECLINED BY PATIENT: ICD-10-CM

## 2025-03-18 PROCEDURE — 3017F COLORECTAL CA SCREEN DOC REV: CPT | Performed by: INTERNAL MEDICINE

## 2025-03-18 PROCEDURE — G8427 DOCREV CUR MEDS BY ELIG CLIN: HCPCS | Performed by: INTERNAL MEDICINE

## 2025-03-18 PROCEDURE — G8419 CALC BMI OUT NRM PARAM NOF/U: HCPCS | Performed by: INTERNAL MEDICINE

## 2025-03-18 PROCEDURE — 3075F SYST BP GE 130 - 139MM HG: CPT | Performed by: INTERNAL MEDICINE

## 2025-03-18 PROCEDURE — 99214 OFFICE O/P EST MOD 30 MIN: CPT | Performed by: INTERNAL MEDICINE

## 2025-03-18 PROCEDURE — 3079F DIAST BP 80-89 MM HG: CPT | Performed by: INTERNAL MEDICINE

## 2025-03-18 PROCEDURE — 4004F PT TOBACCO SCREEN RCVD TLK: CPT | Performed by: INTERNAL MEDICINE

## 2025-03-18 PROCEDURE — G0296 VISIT TO DETERM LDCT ELIG: HCPCS | Performed by: INTERNAL MEDICINE

## 2025-03-18 SDOH — ECONOMIC STABILITY: FOOD INSECURITY: WITHIN THE PAST 12 MONTHS, YOU WORRIED THAT YOUR FOOD WOULD RUN OUT BEFORE YOU GOT MONEY TO BUY MORE.: NEVER TRUE

## 2025-03-18 SDOH — ECONOMIC STABILITY: FOOD INSECURITY: WITHIN THE PAST 12 MONTHS, THE FOOD YOU BOUGHT JUST DIDN'T LAST AND YOU DIDN'T HAVE MONEY TO GET MORE.: NEVER TRUE

## 2025-03-18 ASSESSMENT — ENCOUNTER SYMPTOMS
VOMITING: 0
NAUSEA: 0
WHEEZING: 0
ANAL BLEEDING: 0
CHOKING: 0
ABDOMINAL PAIN: 0
COUGH: 0
CONSTIPATION: 0
BLOOD IN STOOL: 0
CHEST TIGHTNESS: 0
DIARRHEA: 0
SHORTNESS OF BREATH: 0

## 2025-03-18 ASSESSMENT — PATIENT HEALTH QUESTIONNAIRE - PHQ9
SUM OF ALL RESPONSES TO PHQ QUESTIONS 1-9: 0
2. FEELING DOWN, DEPRESSED OR HOPELESS: NOT AT ALL
SUM OF ALL RESPONSES TO PHQ QUESTIONS 1-9: 0
1. LITTLE INTEREST OR PLEASURE IN DOING THINGS: NOT AT ALL

## 2025-03-18 ASSESSMENT — VISUAL ACUITY: OU: 1

## 2025-03-18 NOTE — PROGRESS NOTES
[facility]  [facility]  [address] [address]  Dept: [phone]  Dept Fax: [phone]      Gael Yousif Jr. is a 59 y.o. male who presents today for hismedical conditions/complaints as noted below.  Gael Yousif Jr. is c/o of Hypertension        Assessment/Plan:     1. Essential hypertension  2. Colon cancer screening  -     Tejal Candelario MD, [facility], [place]  3. Terminal ileitis without complication (HCC)  -     Tejal Candelario MD, [facility]enterology, [place]  4. Dyslipidemia (high LDL; low HDL)  5. Vaping nicotine dependence, tobacco product  6. Lung cancer screening declined by patient  7. Personal history of tobacco use  -     GA VISIT TO DISCUSS LUNG CA SCREEN W LDCT  8. Skin lesion          No follow-ups on file.      HPI     Hypertension-tolerating current regimen without chest pain, palpitations, dizziness, peripheral edema, dyspnea on exertion, orthopnea, paroxysmal nocturnal dyspnea.  Hyperlipidemia-tolerating current regimen without myalgias, dyspepsia, jaundice.   Continues to vape 3-4 ml/day, nicotine vape - tobacco based, 6 mg according to bottle   Mostly compliant with diet recommendations for low salt diet, tries to limit greasy/cheesy/fried foods, not very compliant with exercise recommendations.    Cardiovascular risk factors: advanced age (older than 55 for men, 65 for women), dyslipidemia, hypertension, male gender, sedentary lifestyle, and smoking/ tobacco exposure          BP Readings from Last 3 Encounters:   03/18/25 132/82   09/18/24 138/84   03/13/24 130/76              Past Medical History:   Diagnosis Date    Hypertension     Terminal ileitis of small intestine (HCC)       Past Surgical History:   Procedure Laterality Date    COLONOSCOPY  09/25/2017    erosions; edema and patchy acute inflammation    GA COLONOSCOPY W/BIOPSY SINGLE/MULTIPLE N/A 9/25/2017    COLONOSCOPY WITH BIOPSY performed by Tejal Wylie MD at

## 2025-04-08 ENCOUNTER — TELEPHONE (OUTPATIENT)
Dept: GASTROENTEROLOGY | Age: 60
End: 2025-04-08

## 2025-04-09 NOTE — TELEPHONE ENCOUNTER
Patient called today and left a voicemail at 1142 am Return call back to set up for appointment. Please advise.

## 2025-04-18 ENCOUNTER — OFFICE VISIT (OUTPATIENT)
Dept: GASTROENTEROLOGY | Age: 60
End: 2025-04-18
Payer: COMMERCIAL

## 2025-04-18 VITALS
TEMPERATURE: 98.6 F | DIASTOLIC BLOOD PRESSURE: 72 MMHG | SYSTOLIC BLOOD PRESSURE: 139 MMHG | WEIGHT: 158 LBS | HEART RATE: 75 BPM | BODY MASS INDEX: 25.5 KG/M2

## 2025-04-18 DIAGNOSIS — K50.00 TERMINAL ILEITIS WITHOUT COMPLICATION (HCC): Primary | ICD-10-CM

## 2025-04-18 DIAGNOSIS — Z12.12 SCREENING FOR COLORECTAL CANCER: ICD-10-CM

## 2025-04-18 DIAGNOSIS — Z12.11 SCREENING FOR COLORECTAL CANCER: ICD-10-CM

## 2025-04-18 PROCEDURE — 3017F COLORECTAL CA SCREEN DOC REV: CPT | Performed by: INTERNAL MEDICINE

## 2025-04-18 PROCEDURE — 3075F SYST BP GE 130 - 139MM HG: CPT | Performed by: INTERNAL MEDICINE

## 2025-04-18 PROCEDURE — 99204 OFFICE O/P NEW MOD 45 MIN: CPT | Performed by: INTERNAL MEDICINE

## 2025-04-18 PROCEDURE — 3078F DIAST BP <80 MM HG: CPT | Performed by: INTERNAL MEDICINE

## 2025-04-18 PROCEDURE — G8419 CALC BMI OUT NRM PARAM NOF/U: HCPCS | Performed by: INTERNAL MEDICINE

## 2025-04-18 PROCEDURE — 4004F PT TOBACCO SCREEN RCVD TLK: CPT | Performed by: INTERNAL MEDICINE

## 2025-04-18 PROCEDURE — G8427 DOCREV CUR MEDS BY ELIG CLIN: HCPCS | Performed by: INTERNAL MEDICINE

## 2025-04-18 ASSESSMENT — ENCOUNTER SYMPTOMS
COLOR CHANGE: 0
NAUSEA: 0
ABDOMINAL PAIN: 0
TROUBLE SWALLOWING: 0
CHOKING: 0
WHEEZING: 0
SORE THROAT: 0
ANAL BLEEDING: 0
CONSTIPATION: 0
COUGH: 0
BLOOD IN STOOL: 0
RECTAL PAIN: 0
DIARRHEA: 0
ABDOMINAL DISTENTION: 0
VOMITING: 0
SHORTNESS OF BREATH: 0

## 2025-04-18 NOTE — PROGRESS NOTES
Constitutional:       Appearance: He is well-developed.      Comments: Anxious    HENT:      Head: Normocephalic and atraumatic.   Eyes:      Conjunctiva/sclera: Conjunctivae normal.      Pupils: Pupils are equal, round, and reactive to light.   Cardiovascular:      Rate and Rhythm: Normal rate and regular rhythm.   Pulmonary:      Effort: Pulmonary effort is normal.      Breath sounds: Normal breath sounds.   Abdominal:      General: Bowel sounds are normal.      Palpations: Abdomen is soft.      Comments: NON TENDER, NON DISTENTED  LIVER SPLEEN AND HERNIAS ARE NOT  PALPABLE  BOWEL SOUNDS ARE POSITIVE      Genitourinary:     Rectum: Normal.   Musculoskeletal:         General: Normal range of motion.      Cervical back: Normal range of motion and neck supple.   Skin:     General: Skin is warm.   Neurological:      Mental Status: He is alert and oriented to person, place, and time.      Deep Tendon Reflexes: Reflexes are normal and symmetric.           LABORATORY DATA: Reviewed  Lab Results   Component Value Date    WBC 8.7 09/18/2023    HGB 14.7 09/18/2023    HCT 47.1 09/18/2023    MCV 94.4 09/18/2023     09/18/2023     09/03/2024    K 4.8 09/03/2024     09/03/2024    CO2 23 09/03/2024    BUN 17 09/03/2024    CREATININE 1.0 09/03/2024    BILITOT 0.7 09/03/2024    ALKPHOS 82 09/03/2024    AST 28 09/03/2024    ALT 27 09/03/2024         Lab Results   Component Value Date    RBC 4.99 09/18/2023    HGB 14.7 09/18/2023    MCV 94.4 09/18/2023    MCH 29.5 09/18/2023    MCHC 31.2 09/18/2023    RDW 13.2 09/18/2023    MPV 9.5 09/18/2023    BASOPCT 0 09/18/2023    LYMPHSABS 1.86 09/18/2023    MONOSABS 0.52 09/18/2023    NEUTROABS 6.13 09/18/2023    EOSABS 0.10 09/18/2023    BASOSABS <0.03 09/18/2023         DIAGNOSTIC TESTING:     No results found.       Assessment  1. Terminal ileitis without complication (HCC)    2. Screening for colorectal cancer        Plan    Will schedule for Colonoscopy in 2027    He

## 2025-05-18 PROBLEM — Z12.12 SCREENING FOR COLORECTAL CANCER: Status: RESOLVED | Noted: 2025-04-18 | Resolved: 2025-05-18

## 2025-05-18 PROBLEM — Z12.11 SCREENING FOR COLORECTAL CANCER: Status: RESOLVED | Noted: 2025-04-18 | Resolved: 2025-05-18

## 2025-06-09 DIAGNOSIS — E78.00 HYPERCHOLESTEROLEMIA: ICD-10-CM

## 2025-06-09 DIAGNOSIS — I10 ESSENTIAL HYPERTENSION: ICD-10-CM

## 2025-06-09 NOTE — TELEPHONE ENCOUNTER
Last visit: 03/18/2025  Last Med refill: 05/12/2025  Does patient have enough medication for 72 hours: No:     Next Visit Date:  Future Appointments   Date Time Provider Department Center   9/18/2025 12:00 PM Funmilayo Subramanian MD [facility]       Health Maintenance   Topic Date Due    Hepatitis B vaccine (1 of 3 - 19+ 3-dose series) Never done    Pneumococcal 50+ years Vaccine (1 of 1 - PCV) Never done    Colorectal Cancer Screen  09/13/2024    COVID-19 Vaccine (4 - 2024-25 season) 03/18/2026 (Originally 9/1/2024)    Flu vaccine (Season Ended) 08/01/2025    Lipids  09/03/2025    Depression Screen  03/18/2026    Lung Cancer Screening &/or Counseling  03/18/2026    DTaP/Tdap/Td vaccine (3 - Td or Tdap) 09/08/2032    Shingles vaccine  Completed    Hepatitis C screen  Completed    HIV screen  Addressed    Hepatitis A vaccine  Aged Out    Hib vaccine  Aged Out    Polio vaccine  Aged Out    Meningococcal (ACWY) vaccine  Aged Out    Meningococcal B vaccine  Aged Out    Prostate Specific Antigen (PSA) Screening or Monitoring  Discontinued       Hemoglobin A1C (%)   Date Value   07/17/2018 5.4   09/27/2014 5.8             ( goal A1C is < 7)   No components found for: \"LABMICR\"  No components found for: \"LDLCHOLESTEROL\", \"LDLCALC\"    (goal LDL is <100)   AST (U/L)   Date Value   09/03/2024 28     ALT (U/L)   Date Value   09/03/2024 27     BUN (mg/dL)   Date Value   09/03/2024 17     BP Readings from Last 3 Encounters:   04/18/25 139/72   03/18/25 132/82   09/18/24 138/84          (goal 120/80)    All Future Testing planned in [facility]  Lab Frequency Next Occurrence               Patient Active Problem List:     Dyslipidemia (high LDL; low HDL)     Essential hypertension     Positive FIT (fecal immunochemical test)     Edema of colon     Terminal ileitis without complication (HCC)     Erectile dysfunction     Tobacco abuse     Rupture of right distal biceps tendon     Shift work sleep disorder     Vaping

## 2025-06-10 RX ORDER — ATORVASTATIN CALCIUM 20 MG/1
20 TABLET, FILM COATED ORAL DAILY
Qty: 90 TABLET | Refills: 1 | Status: SHIPPED | OUTPATIENT
Start: 2025-06-10

## 2025-06-10 RX ORDER — LISINOPRIL 20 MG/1
20 TABLET ORAL DAILY
Qty: 90 TABLET | Refills: 1 | Status: SHIPPED | OUTPATIENT
Start: 2025-06-10

## (undated) DEVICE — SYRINGE MED 50ML LUERLOCK TIP

## (undated) DEVICE — FORCEPS BX L240CM JAW DIA2.4MM ORNG L CAP W/ NDL DISP RAD

## (undated) DEVICE — JELLY,LUBE,STERILE,FLIP TOP,TUBE,2-OZ: Brand: MEDLINE

## (undated) DEVICE — AIRLIFE™ NASAL OXYGEN CANNULA CURVED, FLARED TIP, WITH 7 FEET (2.1 M) CRUSH RESISTANT TUBING, OVER-THE-EAR STYLE: Brand: AIRLIFE™

## (undated) DEVICE — DEFENDO AIR WATER SUCTION AND BIOPSY VALVE KIT FOR  OLYMPUS: Brand: DEFENDO AIR/WATER/SUCTION AND BIOPSY VALVE